# Patient Record
Sex: FEMALE | Race: WHITE | URBAN - METROPOLITAN AREA
[De-identification: names, ages, dates, MRNs, and addresses within clinical notes are randomized per-mention and may not be internally consistent; named-entity substitution may affect disease eponyms.]

---

## 2017-01-16 ENCOUNTER — TRANSFERRED RECORDS (OUTPATIENT)
Dept: HEALTH INFORMATION MANAGEMENT | Facility: CLINIC | Age: 67
End: 2017-01-16

## 2017-02-08 ENCOUNTER — TRANSFERRED RECORDS (OUTPATIENT)
Dept: HEALTH INFORMATION MANAGEMENT | Facility: CLINIC | Age: 67
End: 2017-02-08

## 2017-04-03 ENCOUNTER — PRE VISIT (OUTPATIENT)
Dept: NEUROLOGY | Facility: CLINIC | Age: 67
End: 2017-04-03

## 2017-04-03 NOTE — TELEPHONE ENCOUNTER
1.  Date/reason for appt:  4/13/17   Chronic Head Pain    2.  Referring provider:  Zoe Grossman    3.  Call to patient (Yes / No - short description):  No, referred.    4.  Previous care at / records requested from:  [facility]

## 2017-04-07 NOTE — TELEPHONE ENCOUNTER
Records received from [facility]. Waiting for images.   Included  Office notes: 2/25/16, 5/3/16, 10/19/16, 12/6/16, 1/12/17, 2/8/17  Radiology reports: MRI head brain on 1/16/17   CT thoracic on 2/8/17   CT lumbar on 2/8/17   CT cervical on 2/3/17   Xray blood patch on 2/3/17   Xray myelogram cervical thoracic lumbar on 2/3/17  Other: bilateral occipital neuralgia injection on 5/3/16, 12/6/16

## 2017-04-13 ENCOUNTER — OFFICE VISIT (OUTPATIENT)
Dept: NEUROLOGY | Facility: CLINIC | Age: 67
End: 2017-04-13

## 2017-04-13 VITALS
BODY MASS INDEX: 30.96 KG/M2 | HEIGHT: 61 IN | TEMPERATURE: 97.1 F | DIASTOLIC BLOOD PRESSURE: 67 MMHG | OXYGEN SATURATION: 95 % | SYSTOLIC BLOOD PRESSURE: 146 MMHG | HEART RATE: 59 BPM | WEIGHT: 164 LBS | RESPIRATION RATE: 20 BRPM

## 2017-04-13 DIAGNOSIS — G44.89 OTHER HEADACHE SYNDROME: Primary | ICD-10-CM

## 2017-04-13 RX ORDER — ACETAMINOPHEN 160 MG
4000 TABLET,DISINTEGRATING ORAL DAILY
COMMUNITY
Start: 2017-01-16 | End: 2017-09-05

## 2017-04-13 RX ORDER — DIPHENOXYLATE HYDROCHLORIDE AND ATROPINE SULFATE 2.5; .025 MG/1; MG/1
1 TABLET ORAL DAILY
COMMUNITY
Start: 2007-06-20 | End: 2017-09-05

## 2017-04-13 RX ORDER — PROPRANOLOL HYDROCHLORIDE 80 MG/1
80 CAPSULE, EXTENDED RELEASE ORAL DAILY
COMMUNITY
Start: 2017-01-31

## 2017-04-13 RX ORDER — AMLODIPINE BESYLATE 2.5 MG/1
2.5 TABLET ORAL DAILY
COMMUNITY
Start: 2017-03-08

## 2017-04-13 RX ORDER — OXYCODONE AND ACETAMINOPHEN 5; 325 MG/1; MG/1
1-2 TABLET ORAL DAILY PRN
COMMUNITY
Start: 2017-03-14

## 2017-04-13 RX ORDER — HYDROCHLOROTHIAZIDE 25 MG/1
25 TABLET ORAL DAILY
COMMUNITY
Start: 2017-03-31

## 2017-04-13 ASSESSMENT — PAIN SCALES - GENERAL: PAINLEVEL: MILD PAIN (3)

## 2017-04-13 NOTE — LETTER
4/13/2017       RE: Renée Almeida  [address]  [facility] [address]     Dear Colleague,    Thank you for referring your patient, Renée Almeida, to the [facility] NEUROLOGY at [facility]. Please see a copy of my visit note below.    HISTORY OF PRESENT ILLNESS:    Mrs. Almeida is a 66-year-old comes accompanied by her .  They are excellent historians.  She has been having headaches or other symptoms that I would relate to.  Her story goes that in the 1980s she had this severe pressure headache which was totally positional and which developed apparently fairly abruptly and she had to stay in bed and was diagnosed to have a low pressure headache syndrome and was treated with blood patch and bed rest and eventually got over it.  About 2 years ago, some of the symptoms have return.  She gets headaches and a particular feeling in her head when she stands up, but it is more the feeling.  She has had no abrupt headaches suggesting aneurysm or other than that.  She was told in 1980 she had a spinal leak.      She now has frontotemporal headaches and worst upon standing all may occur on recumbency.  She has had other sensations as well which included some transient numbness of arms and legs and dizziness when she stands up, and she was evaluated by Dr. Concepcion and was hospitalized at [facility] where they tried blood patch to see if it would help her.  The imaging studies have consistently shown superficial siderosis of the brain and she had her last CT scan on 03/27, which was actually normal, but her MRI in 2016 in April showed superficial widespread cysticercosis, most notably on posterior fossa and on the parasagittal area.   No mention is made of midline angiopathy and the sequences do not include a proper one for that.      She did have 1 repeated in January of this year and there was widespread siderosis was again seen.  She has had MR angiogram done in 2014 and that one was  normal.  The MRI of the head in  did still show hemosiderin deposit in the cerebellar convexity, ??, occipital and other regions.  Angiogram was done.  MR angiogram at that time and this was no stenosis or aneurysm was seen.        She had an MRI angiogram of the renal arteries and there have been no abnormalities seen there.  The patient did have attempt at a blood patch at [facility] on March of this year and this was for the epidural blood patch with a question of leakage.  She had had a myelogram done and she also developed some severe stomach cramping and idiopathic intracranial hypertension was also suspected and there were no signs of leakage across any site, and she has had CT scans of the spine and lumbar area and myelogram which was done looking for a leak that would explain the siderosis and the positional headaches and none was seen.  I do not see she has had a CT angio done.      PAST MEDICAL HISTORY:    Indicates that she has no other medical problems and has been healthy.        CURRENT MEDICATIONS:   Her medications consist of:   1.   Norvasc for hypertension.     2.  HydroDIURIL.     3.  Vitamin D.     4.  Multivitamin.   5.  Omeprazole.     6.  Inderal 80 mg per day.        PAST SURGICAL HISTORY:   She has no surgical history.      REVIEW OF SYSTEMS:  Indicates no other problems.      FAMILY HISTORY:  Indicates her mother apparently  of a cerebral aneurysm, leakage or some relatives in the family.      PHYSICAL EXAMINATION:     GENERAL:   She is a very pleasant woman.     NEUROLOGIC:   She has a normal mental status.  Her gait and station unremarkable.  Cranial nerves are normal.  Neck is supple.  Cerebellar testing are normal.  Reflexes are normoactive.  Plantars are flexor.  Sensory exam is normal.  Cerebellar testing is normal.  She is right-handed.      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  Myelographic studies  have not shown any points of CSF leak.  She did have an episode in  of CSF hypovolemic headache syndrome which they attempted to treat with a blood patch.  There is a positive family history of aneurysm.      The source of possible bleeding would include aneurysm, which has not detected or amyloid angiopathy.  She has had MRI studies, but I do not see the sequence that would reveal amyloid angiopathy.  I do not have the actual films, just a report which are excellent.      We will search for a cause.  I may not find any.  We will discuss with our Stroke Service any possible other treatment options.      Thank you for referring her to the [place].      Jovanny Perez MD      cc:   Mark Anthony Muhammad MD   [place] [id number]      Dilma Concepcion MD   [address] [address]      D: 2017 15:15   T: 2017 07:56   MT: JAMES      Name:     SHAWN MAJOR   MRN:      [id number]        Account:      [id number]   :      1950           Service Date: 2017      Document: [id number]

## 2017-04-13 NOTE — MR AVS SNAPSHOT
After Visit Summary   4/13/2017    Renée Almeida    MRN: [id number]           Patient Information     Date Of Birth          1950        Visit Information        Provider Department      4/13/2017 2:00 PM Jovanny Perez MD [facility] Neurology        Today's Diagnoses     Other headache syndrome    -  1       Follow-ups after your visit        Follow-up notes from your care team     Return in about 4 weeks (around 5/11/2017), or if symptoms worsen or fail to improve.      Your next 10 appointments already scheduled     May 06, 2017  8:30 AM CDT   (Arrive by 8:15 AM)   MR BRAIN FOR STROKE LIMITED with [id number]   [facility] ([facility])    [address] [address] [phone]           Take your medicines as usual, unless your doctor tells you not to. Bring a list of your current medicines to your exam (including vitamins, minerals and over-the-counter drugs). Also bring the results of similar scans you may have had.  Please remove any body piercings and hair extensions before you arrive.  You will have contrast for this exam. The day before your exam, drink extra fluids at least six 8-ounce glasses (unless your doctor tells you to restrict your fluids).  The MRI machine uses a strong magnet. Please wear clothes without metal (snaps, zippers). A sweatsuit works well, or we may give you a hospital gown.  You will remove watches, jewelry, hairpins, wallets, dentures, partial dental plates and hearing aids. You may wear contact lenses, and you may be able to wear your rings. We have a safe place to keep your personal items, but it is safer to leave them at home.  A recent (within 30 days) creatinine lab value is needed for patients over 70 years of age or with any type of kidney function compromise.  If you will receive sedation (take medicine to help you relax during your exam)   You must get the medicine from your doctor  before you arrive. Bring the medicine to the exam. Do not take it at home.   Arrive one hour early. Bring someone who can take you home after the test. Your medicine will make you sleepy. After the exam, you may not drive, take a bus or take a taxi by yourself.   No eating 8 hours before your exam. You may have clear liquids up until 4 hours before your exam. (Clear liquids include water, clear tea, black coffee and fruit juice without pulp.)  If you will receive general anesthesia (be asleep for your exam)   Arrive 11/2 hours early. Bring someone who can take you home after the test. You may not drive, take a bus or take a taxi by yourself.   No eating 8 hours before your exam. You may have clear liquids up until 4 hours before your exam. (Clear liquids include water, clear tea, black coffee and fruit juice without pulp.)              Who to contact     Please call your clinic at [phone] to:    Ask questions about your health    Make or cancel appointments    Discuss your medicines    Learn about your test results    Speak to your doctor   If you have compliments or concerns about an experience at your clinic, or if you wish to file a complaint, please contact [facility] Patient Relations at [phone] or email us at [email or web address]         Additional Information About Your Visit        [facility] Information     iversity gives you secure access to your electronic health record. If you see a primary care provider, you can also send messages to your care team and make appointments. If you have questions, please call your primary care clinic.  If you do not have a primary care provider, please call [phone] and they will assist you.      iversity is an electronic gateway that provides easy, online access to your medical records. With iversity, you can request a clinic appointment, read your test results, renew a prescription or communicate with your care team.     To  "access your existing account, please contact your [facility] or call [phone] for assistance.        Care EveryWhere ID     This is your Care EveryWhere ID. This could be used by other organizations to access your [facility] medical records  [id number]        Your Vitals Were     Pulse Temperature Respirations Height Pulse Oximetry Breastfeeding?    59 97.1  F (36.2  C) (Oral) 20 1.549 m (5' 1\") 95% No    BMI (Body Mass Index)                   30.99 kg/m2            Blood Pressure from Last 3 Encounters:   04/13/17 146/67    Weight from Last 3 Encounters:   04/13/17 74.4 kg (164 lb)              Today, you had the following     No orders found for display       Primary Care Provider Office Phone # Fax #    Mark Anthony Muhammad [phone]       [address] [address]        Thank you!     Thank you for choosing [facility] NEUROLOGY  for your care. Our goal is always to provide you with excellent care. Hearing back from our patients is one way we can continue to improve our services. Please take a few minutes to complete the written survey that you may receive in the mail after your visit with us. Thank you!             Your Updated Medication List - Protect others around you: Learn how to safely use, store and throw away your medicines at www.disposemymeds.org.          This list is accurate as of: 4/13/17 11:59 PM.  Always use your most recent med list.                   Brand Name Dispense Instructions for use    amLODIPine 2.5 MG tablet    NORVASC     Take 2.5 mg by mouth daily       hydrochlorothiazide 25 MG tablet    HYDRODIURIL     Take 25 mg by mouth daily       MULTI-VITAMINS Tabs      Take 1 tablet by mouth daily       omeprazole 20 MG CR capsule    priLOSEC     Take 20 mg by mouth daily       oxyCODONE-acetaminophen 5-325 MG per tablet    PERCOCET     Take 1-2 tablets by mouth daily as needed       propranolol 80 MG 24 hr capsule "    INDERAL LA     Take 80 mg by mouth daily       vitamin D3 2000 UNITS Caps      Take 4,000 Units by mouth daily

## 2017-04-14 NOTE — PROGRESS NOTES
HISTORY OF PRESENT ILLNESS:    Mrs. Almeida is a 66-year-old comes accompanied by her .  They are excellent historians.  She has been having headaches or other symptoms that I would relate to.  Her story goes that in the 1980s she had this severe pressure headache which was totally positional and which developed apparently fairly abruptly and she had to stay in bed and was diagnosed to have a low pressure headache syndrome and was treated with blood patch and bed rest and eventually got over it.  About 2 years ago, some of the symptoms have return.  She gets headaches and a particular feeling in her head when she stands up, but it is more the feeling.  She has had no abrupt headaches suggesting aneurysm or other than that.  She was told in 1980 she had a spinal leak.      She now has frontotemporal headaches and worst upon standing all may occur on recumbency.  She has had other sensations as well which included some transient numbness of arms and legs and dizziness when she stands up, and she was evaluated by Dr. Concepcion and was hospitalized at [facility] where they tried blood patch to see if it would help her.  The imaging studies have consistently shown superficial siderosis of the brain and she had her last CT scan on 03/27, which was actually normal, but her MRI in 2016 in April showed superficial widespread cysticercosis, most notably on posterior fossa and on the parasagittal area.   No mention is made of midline angiopathy and the sequences do not include a proper one for that.      She did have 1 repeated in January of this year and there was widespread siderosis was again seen.  She has had MR angiogram done in 2014 and that one was normal.  The MRI of the head in 2014 did still show hemosiderin deposit in the cerebellar convexity, ??, occipital and other regions.  Angiogram was done.  MR angiogram at that time and this was no stenosis or aneurysm was seen.        She had an MRI angiogram of the renal  arteries and there have been no abnormalities seen there.  The patient did have attempt at a blood patch at [facility] on March of this year and this was for the epidural blood patch with a question of leakage.  She had had a myelogram done and she also developed some severe stomach cramping and idiopathic intracranial hypertension was also suspected and there were no signs of leakage across any site, and she has had CT scans of the spine and lumbar area and myelogram which was done looking for a leak that would explain the siderosis and the positional headaches and none was seen.  I do not see she has had a CT angio done.      PAST MEDICAL HISTORY:    Indicates that she has no other medical problems and has been healthy.        CURRENT MEDICATIONS:   Her medications consist of:   1.   Norvasc for hypertension.     2.  HydroDIURIL.     3.  Vitamin D.     4.  Multivitamin.   5.  Omeprazole.     6.  Inderal 80 mg per day.        PAST SURGICAL HISTORY:   She has no surgical history.      REVIEW OF SYSTEMS:  Indicates no other problems.      FAMILY HISTORY:  Indicates her mother apparently  of a cerebral aneurysm, leakage or some relatives in the family.      PHYSICAL EXAMINATION:     GENERAL:   She is a very pleasant woman.     NEUROLOGIC:   She has a normal mental status.  Her gait and station unremarkable.  Cranial nerves are normal.  Neck is supple.  Cerebellar testing are normal.  Reflexes are normoactive.  Plantars are flexor.  Sensory exam is normal.  Cerebellar testing is normal.  She is right-handed.      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  Myelographic studies have not shown any points of CSF leak.  She did have an episode in  of CSF hypovolemic headache syndrome which they attempted to treat with a blood patch.  There is a positive family history of aneurysm.      The source of possible bleeding would include aneurysm, which  has not detected or amyloid angiopathy.  She has had MRI studies, but I do not see the sequence that would reveal amyloid angiopathy.  I do not have the actual films, just a report which are excellent.      We will search for a cause.  I may not find any.  We will discuss with our Stroke Service any possible other treatment options.      Thank you for referring her to the [facility].      Jovanny Perez MD      cc:   [place] [address]      [place] [address]         JOVANNY PEREZ MD             D: 2017 15:15   T: 2017 07:56   MT: JAMES      Name:     SHAWN MAJOR   MRN:      [id number]        Account:      [id number]   :      1950           Service Date: 2017      Document: [id number]

## 2017-05-02 DIAGNOSIS — G44.031 INTRACTABLE PAROXYSMAL HEMICRANIA, UNSPECIFIED CHRONICITY PATTERN: Primary | ICD-10-CM

## 2017-05-02 DIAGNOSIS — I60.9 SUBARACHNOID HEMORRHAGE (H): ICD-10-CM

## 2017-05-19 ENCOUNTER — PRE VISIT (OUTPATIENT)
Dept: RADIOLOGY | Facility: CLINIC | Age: 67
End: 2017-05-19

## 2017-05-19 NOTE — TELEPHONE ENCOUNTER
1.  Date/reason for appt:5/23/17, Stroke  2.  Referring provider: ALEX KATZ  3.  Call to patient (Yes / No - short description): No, referred   4.  Previous care at / records requested from:   [facility]- office notes and imaging are in epic.

## 2017-05-20 ASSESSMENT — ENCOUNTER SYMPTOMS
NECK PAIN: 0
TREMORS: 0
NUMBNESS: 1
SORE THROAT: 0
STIFFNESS: 0
SEIZURES: 0
LOSS OF CONSCIOUSNESS: 0
WEAKNESS: 1
ORTHOPNEA: 0
INSOMNIA: 1
DIZZINESS: 1
PANIC: 0
NECK MASS: 0
MUSCLE CRAMPS: 1
TASTE DISTURBANCE: 0
DECREASED CONCENTRATION: 1
HYPERTENSION: 1
MUSCLE WEAKNESS: 0
SLEEP DISTURBANCES DUE TO BREATHING: 0
TROUBLE SWALLOWING: 0
ARTHRALGIAS: 0
LEG PAIN: 0
HOARSE VOICE: 0
MEMORY LOSS: 0
LIGHT-HEADEDNESS: 1
SINUS CONGESTION: 0
TINGLING: 0
HYPOTENSION: 0
SYNCOPE: 0
PARALYSIS: 0
LEG SWELLING: 0
TACHYCARDIA: 0
DISTURBANCES IN COORDINATION: 0
NERVOUS/ANXIOUS: 1
MYALGIAS: 0
SMELL DISTURBANCE: 0
DEPRESSION: 0
EXERCISE INTOLERANCE: 1
SPEECH CHANGE: 0
BACK PAIN: 1
HEADACHES: 1
JOINT SWELLING: 0
PALPITATIONS: 0
CLAUDICATION: 0
SINUS PAIN: 0

## 2017-05-23 ENCOUNTER — OFFICE VISIT (OUTPATIENT)
Dept: RADIOLOGY | Facility: CLINIC | Age: 67
End: 2017-05-23

## 2017-05-23 VITALS
DIASTOLIC BLOOD PRESSURE: 59 MMHG | SYSTOLIC BLOOD PRESSURE: 141 MMHG | BODY MASS INDEX: 31.23 KG/M2 | HEART RATE: 62 BPM | HEIGHT: 61 IN | WEIGHT: 165.4 LBS

## 2017-05-23 DIAGNOSIS — G96.89 SUPERFICIAL SIDEROSIS OF CENTRAL NERVOUS SYSTEM: Primary | ICD-10-CM

## 2017-05-23 ASSESSMENT — PAIN SCALES - GENERAL: PAINLEVEL: SEVERE PAIN (7)

## 2017-05-23 NOTE — LETTER
5/23/2017       RE: Renée Almeida  [address]  [place] [address]     Dear Colleague,    Thank you for referring your patient, Renée Almeida, to the [facility] NEUROSURGERY at [facility]. Please see a copy of my visit note below.    Neurointerventional Clinic Note    HPI: Renée Almeida is a 66 year old female with history of intractable headaches. Her headaches are dull and involves the both sides. She has these headaches daily. Tylenol provides some relief. Her work-up MRI showed superficial siderosis along the cerebellar convexities. No aneurysm or other vascular malformation in the MRA of the head and neck. She is otherwise neurologically intact.       Past Medical History:   Diagnosis Date     Arthritis     hand     Gastroesophageal reflux disease      Hypertension      Superficial siderosis of central nervous system      Past Surgical History:   Procedure Laterality Date     CHOLECYSTECTOMY       HYSTERECTOMY       KNEE SURGERY      right knee arthroscopy     SHOULDER SURGERY      right rotator cuff and bicep repair     Social History     Social History     Marital status: Single     Spouse name: N/A     Number of children: N/A     Years of education: N/A     Occupational History     Not on file.     Social History Main Topics     Smoking status: Never Smoker     Smokeless tobacco: Never Used     Alcohol use No     Drug use: No     Sexual activity: Yes     Partners: Male     Other Topics Concern     Not on file     Social History Narrative     History reviewed. No pertinent family history.  Allergies   Allergen Reactions     Codeine Other (See Comments)     Chest pain     Review of Systems    Current Outpatient Prescriptions on File Prior to Visit:  amLODIPine (NORVASC) 2.5 MG tablet Take 2.5 mg by mouth daily   Cholecalciferol (VITAMIN D3) 2000 UNITS CAPS Take 4,000 Units by mouth daily   hydrochlorothiazide (HYDRODIURIL) 25 MG tablet Take 25 mg by mouth daily  "  Multiple Vitamin (MULTI-VITAMINS) TABS Take 1 tablet by mouth daily   omeprazole (PRILOSEC) 20 MG CR capsule Take 20 mg by mouth daily   oxyCODONE-acetaminophen (PERCOCET) 5-325 MG per tablet Take 1-2 tablets by mouth daily as needed   propranolol (INDERAL LA) 80 MG 24 hr capsule Take 80 mg by mouth daily     No current facility-administered medications on file prior to visit.     /59 (BP Location: Left arm, Patient Position: Chair, Cuff Size: Adult Regular)  Pulse 62  Ht 1.549 m (5' 1\")  Wt 75 kg (165 lb 6.4 oz)  BMI 31.25 kg/m2  MS: AAOx3  Speech: no aphasia or dysarthria  CN: II-XII intact  Motor: 5/5 strength throughout, nml tone and bulk; no abnormal movement noted  Sensory: intact to LT x 4  Coordination: FNF/AMBER/HKS intact    Labs: Reviewed     Images: Reviewed     A/P: 66 year old female with superficial cortical siderosis. No vascular malformation on head and neck MRA. The source of hemosiderin could be from a spinal vascular malformation. We would like to the a spinal angiography to assess for any vascular malformation. Patient agrees with the plan.    Patient is discussed with the attending physician Dr Irvin.    Isacc Child M.D.  Endovascular Surgical Neuroradiology Fellow  Pager: [phone]    I agree with the above note by Dr. Child        on  5/23/17    Again, thank you for allowing me to participate in the care of your patient.    Sincerely,  Jourdan Irvin MD      "

## 2017-05-23 NOTE — MR AVS SNAPSHOT
After Visit Summary   5/23/2017    Renée Almeida    MRN: [id number]           Patient Information     Date Of Birth          1950        Visit Information        Provider Department      5/23/2017 2:10 PM Jourdan Irvin MD [facility] Neurosurgery        Today's Diagnoses     Superficial siderosis of central nervous system    -  1       Follow-ups after your visit        Your next 10 appointments already scheduled     Jun 02, 2017   Procedure with GENERIC ANESTHESIA PROVIDER   [facility]    [address] [id number] [phone]            Jun 02, 2017 12:00 PM CDT   IR CAROTID CEREBRAL ANGIOGRAM BILATERAL with UUIR1   [facility] [place], Interventional Radiology ([facility], [facility])    [address] [id number] [phone]           1. Your doctor will need to do a history and physical within 30 days before this procedure. 2. Your doctor will determine whether you need a 12 lead EKG, as well as which medications should not be taken the morning of the exam. 3. Laboratory tests are to be obtained by your doctor prior to the exam (creatinine, Hgb/Hct, platelet count, and INR) 4. If you have allergies to x-ray contrast or iodine, contact your doctor or a Radiology nurse prior to the exam day for instructions. 5. Someone will need to drive you to and from the hospital. 6. Bring a list of all drugs you are taking; include supplements and over-the-counter medications. 7. Wear comfortable clothes and leave your valuables at home. 8. If you are or may be pregnant, contact your doctor or a Radiology nurse prior to the day of the exam. 9.  If you have diabetes, check with your doctor or a Radiology nurse to see if your insulin needs to be adjusted for the exam. 10. If you are taking Coumadin (to thin you blood) please contact your doctor or a Radiology nurse at least 5 days before the exam  for special instructions. 11. You should not have received barium (x-ray contrast) within 48 hours of this exam. 12. The day before your exam you may eat your regular diet and are encouraged to drink at least 2 quarts of clear liquids. Drink no alcoholic beverages for 24 hours prior to the exam. 13. If you have a colostomy you will need to irrigate it with tap water at 8PM the evening before and again at 6AM the morning of the exam. 14. Do not smoke for 24 hours prior to the procedure. 15. Do not eat any solid food or milk products for 6 hours prior to the exam. You may drink clear liquids until 2 hours prior to the exam. Clear liquids include the following: water, Jell-O, clear broth, apple juice or any non-carbonated drink that you can see through (no pop!) 16. The morning of the exam you may brush your teeth and take medications as directed with a sip of water. 17. Tell the Radiology nurse if you have any allergies. 18. You will be asked to empty your bladder before the exam begins. 19. Following the exam you will need to remain on complete bedrest for 4-6 hours. The nurse will monitor your vital signs, puncture site, and the pulses and temperature of the arm or leg that was punctured. 20. When discharged, you cannot drive until morning, and an adult must be with you until then. You should stay in the [facility] area overnight.              Who to contact     Please call your clinic at [phone] to:    Ask questions about your health    Make or cancel appointments    Discuss your medicines    Learn about your test results    Speak to your doctor   If you have compliments or concerns about an experience at your clinic, or if you wish to file a complaint, please contact [facility] Patient Relations at [phone] or email us at [email or web address]         Additional Information About Your Visit        DeskActivehart Information     TYMR gives you secure access to your  "electronic health record. If you see a primary care provider, you can also send messages to your care team and make appointments. If you have questions, please call your primary care clinic.  If you do not have a primary care provider, please call [phone] and they will assist you.      tibdit is an electronic gateway that provides easy, online access to your medical records. With tibdit, you can request a clinic appointment, read your test results, renew a prescription or communicate with your care team.     To access your existing account, please contact your [facility] or call [phone] for assistance.        Care EveryWhere ID     This is your Care EveryWhere ID. This could be used by other organizations to access your [place] medical records  [id number]        Your Vitals Were     Pulse Height BMI (Body Mass Index)             62 1.549 m (5' 1\") 31.25 kg/m2          Blood Pressure from Last 3 Encounters:   05/23/17 141/59   04/13/17 146/67    Weight from Last 3 Encounters:   05/23/17 75 kg (165 lb 6.4 oz)   04/13/17 74.4 kg (164 lb)               Primary Care Provider Office Phone # Fax #    Mark Anthony Muhammad [phone]       [place] [phone]        Thank you!     Thank you for choosing [facility]  for your care. Our goal is always to provide you with excellent care. Hearing back from our patients is one way we can continue to improve our services. Please take a few minutes to complete the written survey that you may receive in the mail after your visit with us. Thank you!             Your Updated Medication List - Protect others around you: Learn how to safely use, store and throw away your medicines at www.disposemymeds.org.          This list is accurate as of: 5/23/17 11:59 PM.  Always use your most recent med list.                   Brand Name Dispense Instructions for use    amLODIPine 2.5 MG tablet "    NORVASC     Take 2.5 mg by mouth daily       hydrochlorothiazide 25 MG tablet    HYDRODIURIL     Take 25 mg by mouth daily       MULTI-VITAMINS Tabs      Take 1 tablet by mouth daily       omeprazole 20 MG CR capsule    priLOSEC     Take 20 mg by mouth daily       oxyCODONE-acetaminophen 5-325 MG per tablet    PERCOCET     Take 1-2 tablets by mouth daily as needed       propranolol 80 MG 24 hr capsule    INDERAL LA     Take 80 mg by mouth daily       vitamin D3 2000 UNITS Caps      Take 4,000 Units by mouth daily

## 2017-05-24 ENCOUNTER — CARE COORDINATION (OUTPATIENT)
Dept: NEUROLOGY | Facility: CLINIC | Age: 67
End: 2017-05-24

## 2017-05-24 NOTE — PATIENT INSTRUCTIONS
You are scheduled for a spinal angiogram (under general anesthesia) on 6/2/17 at 12:00 PM. You will need a pre-op physical within 30 days prior to your procedure. Someone will need to drive you home from the hospital. Be sure to have someone that can stay with you through the night. Do not eat after 4:00 AM; you may drink clear liquids (includes water, Jell-O, clear broth, apple juice or any non-carbonated beverage that you can see through) until 10:00 AM. You may take your medications with a sip of water the morning of the procedure. Please go to 3C, Surgery Check-in, on the third floor of [facility] to check in at 10:00 AM.    If you have any questions please contact me at [phone], option 3.    Angelo Agee RN, CNRN  Stroke & Endovascular Care Coordinator

## 2017-05-24 NOTE — PROGRESS NOTES
Patient scheduled for a spinal angiogram (under general anesthesia) on 6/2/17 at 12:00 PM. Informed patient: You will need a pre-op physical within 30 days prior to your procedure. Someone will need to drive you home from the hospital. Be sure to have someone that can stay with you through the night. Do not eat after 4:00 AM; you may drink clear liquids (includes water, Jell-O, clear broth, apple juice or any non-carbonated beverage that you can see through) until 10:00 AM. You may take your medications with a sip of water the morning of the procedure. Please go to 3C, Surgery Check-in, on the third floor of [facility] to check in at 10:00 AM. Patient verbalized understanding. Map and instructions sent to patient.

## 2017-05-30 NOTE — PROGRESS NOTES
Neurointerventional Clinic Note    HPI: Renée Almeida is a 66 year old female with history of intractable headaches. Her headaches are dull and involves the both sides. She has these headaches daily. Tylenol provides some relief. Her work-up MRI showed superficial siderosis along the cerebellar convexities. No aneurysm or other vascular malformation in the MRA of the head and neck. She is otherwise neurologically intact.       Past Medical History:   Diagnosis Date     Arthritis     hand     Gastroesophageal reflux disease      Hypertension      Superficial siderosis of central nervous system      Past Surgical History:   Procedure Laterality Date     CHOLECYSTECTOMY       HYSTERECTOMY       KNEE SURGERY      right knee arthroscopy     SHOULDER SURGERY      right rotator cuff and bicep repair     Social History     Social History     Marital status: Single     Spouse name: N/A     Number of children: N/A     Years of education: N/A     Occupational History     Not on file.     Social History Main Topics     Smoking status: Never Smoker     Smokeless tobacco: Never Used     Alcohol use No     Drug use: No     Sexual activity: Yes     Partners: Male     Other Topics Concern     Not on file     Social History Narrative     History reviewed. No pertinent family history.  Allergies   Allergen Reactions     Codeine Other (See Comments)     Chest pain       Review of Systems    Answers for HPI/ROS submitted by the patient on 5/20/2017   General Symptoms: No  Skin Symptoms: No  HENT Symptoms: Yes  EYE SYMPTOMS: No  HEART SYMPTOMS: Yes  LUNG SYMPTOMS: No  INTESTINAL SYMPTOMS: No  URINARY SYMPTOMS: No  GYNECOLOGIC SYMPTOMS: No  BREAST SYMPTOMS: No  SKELETAL SYMPTOMS: Yes  BLOOD SYMPTOMS: No  NERVOUS SYSTEM SYMPTOMS: Yes  MENTAL HEALTH SYMPTOMS: Yes  Ear pain: No  Ear discharge: No  Hearing loss: No  Tinnitus: Yes  Nosebleeds: No  Congestion: No  Sinus pain: No  Trouble swallowing: No   Voice hoarseness: No  Mouth sores:  No  Sore throat: No  Tooth pain: No  Gum tenderness: No  Bleeding gums: No  Change in taste: No  Change in sense of smell: No  Dry mouth: No  Hearing aid used: No  Neck lump: No  Chest pain or pressure: No  Fast or irregular heartbeat: No  Pain in legs with walking: No  Swelling in feet or ankles: No  Trouble breathing while lying down: No  Fingers or Toes appear blue: No  High blood pressure: Yes  Low blood pressure: No  Fainting: No  Murmurs: No  Chest pain on exertion: No  Chest pain at rest: No  Cramping pain in leg during exercise: No  Pacemaker: No  Varicose veins: No  Edema or swelling: No  Fast heart beat: No  Wake up at night with shortness of breath: No  Heart flutters: No  Light-headedness: Yes  Exercise intolerance: Yes  Back pain: Yes  Muscle aches: No  Neck pain: No  Swollen joints: No  Joint pain: No  Bone pain: No  Muscle cramps: Yes  Muscle weakness: No  Joint stiffness: No  Bone fracture: No  Trouble with coordination: No  Dizziness or trouble with balance: Yes  Fainting or black-out spells: No  Memory loss: No  Headache: Yes  Seizures: No  Speech problems: No  Tingling: No  Tremor: No  Weakness: Yes  Difficulty walking: Yes  Paralysis: No  Numbness: Yes  Nervous or Anxious: Yes  Depression: No  Trouble sleeping: Yes  Trouble thinking or concentrating: Yes  Mood changes: No  Panic attacks: No      Current Outpatient Prescriptions on File Prior to Visit:  amLODIPine (NORVASC) 2.5 MG tablet Take 2.5 mg by mouth daily   Cholecalciferol (VITAMIN D3) 2000 UNITS CAPS Take 4,000 Units by mouth daily   hydrochlorothiazide (HYDRODIURIL) 25 MG tablet Take 25 mg by mouth daily   Multiple Vitamin (MULTI-VITAMINS) TABS Take 1 tablet by mouth daily   omeprazole (PRILOSEC) 20 MG CR capsule Take 20 mg by mouth daily   oxyCODONE-acetaminophen (PERCOCET) 5-325 MG per tablet Take 1-2 tablets by mouth daily as needed   propranolol (INDERAL LA) 80 MG 24 hr capsule Take 80 mg by mouth daily     No current  "facility-administered medications on file prior to visit.     /59 (BP Location: Left arm, Patient Position: Chair, Cuff Size: Adult Regular)  Pulse 62  Ht 1.549 m (5' 1\")  Wt 75 kg (165 lb 6.4 oz)  BMI 31.25 kg/m2  MS: AAOx3  Speech: no aphasia or dysarthria  CN: II-XII intact  Motor: 5/5 strength throughout, nml tone and bulk; no abnormal movement noted  Sensory: intact to LT x 4  Coordination: FNF/AMBER/HKS intact    Labs: Reviewed     Images: Reviewed     A/P: 66 year old female with superficial cortical siderosis. No vascular malformation on head and neck MRA. The source of hemosiderin could be from a spinal vascular malformation. We would like to the a spinal angiography to assess for any vascular malformation. Patient agrees with the plan.    Patient is discussed with the attending physician Dr Irvin.    Isacc Child M.D.  Endovascular Surgical Neuroradiology Fellow  Pager: [phone]    I agree with the above note by Dr. Child        on  5/23/17    "

## 2017-06-01 RX ORDER — FLUMAZENIL 0.1 MG/ML
0.2 INJECTION, SOLUTION INTRAVENOUS
Status: CANCELLED | OUTPATIENT
Start: 2017-06-01

## 2017-06-01 RX ORDER — FENTANYL CITRATE 50 UG/ML
25-50 INJECTION, SOLUTION INTRAMUSCULAR; INTRAVENOUS EVERY 5 MIN PRN
Status: CANCELLED | OUTPATIENT
Start: 2017-06-01

## 2017-06-01 RX ORDER — NALOXONE HYDROCHLORIDE 0.4 MG/ML
.1-.4 INJECTION, SOLUTION INTRAMUSCULAR; INTRAVENOUS; SUBCUTANEOUS
Status: CANCELLED | OUTPATIENT
Start: 2017-06-01

## 2017-06-02 ENCOUNTER — ANESTHESIA (OUTPATIENT)
Dept: SURGERY | Facility: CLINIC | Age: 67
End: 2017-06-02
Payer: MEDICARE

## 2017-06-02 ENCOUNTER — ANESTHESIA EVENT (OUTPATIENT)
Dept: SURGERY | Facility: CLINIC | Age: 67
End: 2017-06-02
Payer: MEDICARE

## 2017-06-02 ENCOUNTER — SURGERY (OUTPATIENT)
Age: 67
End: 2017-06-02

## 2017-06-02 ENCOUNTER — APPOINTMENT (OUTPATIENT)
Dept: INTERVENTIONAL RADIOLOGY/VASCULAR | Facility: CLINIC | Age: 67
End: 2017-06-02
Attending: RADIOLOGY
Payer: MEDICARE

## 2017-06-02 ENCOUNTER — HOSPITAL ENCOUNTER (OUTPATIENT)
Facility: CLINIC | Age: 67
Discharge: HOME | End: 2017-06-02
Attending: RADIOLOGY | Admitting: RADIOLOGY
Payer: MEDICARE

## 2017-06-02 VITALS
DIASTOLIC BLOOD PRESSURE: 68 MMHG | WEIGHT: 164.9 LBS | TEMPERATURE: 98 F | BODY MASS INDEX: 31.13 KG/M2 | HEIGHT: 61 IN | OXYGEN SATURATION: 98 % | SYSTOLIC BLOOD PRESSURE: 128 MMHG | RESPIRATION RATE: 16 BRPM

## 2017-06-02 DIAGNOSIS — G96.89 SUPERFICIAL SIDEROSIS OF CENTRAL NERVOUS SYSTEM: ICD-10-CM

## 2017-06-02 LAB
APTT PPP: 26 SEC (ref 22–37)
CREAT SERPL-MCNC: 0.68 MG/DL (ref 0.52–1.04)
ERYTHROCYTE [DISTWIDTH] IN BLOOD BY AUTOMATED COUNT: 12.4 % (ref 10–15)
GFR SERPL CREATININE-BSD FRML MDRD: 86 ML/MIN/1.7M2
HCT VFR BLD AUTO: 37.9 % (ref 35–47)
HGB BLD-MCNC: 12.7 G/DL (ref 11.7–15.7)
INR PPP: 1.01 (ref 0.86–1.14)
MCH RBC QN AUTO: 30.3 PG (ref 26.5–33)
MCHC RBC AUTO-ENTMCNC: 33.5 G/DL (ref 31.5–36.5)
MCV RBC AUTO: 91 FL (ref 78–100)
PLATELET # BLD AUTO: 304 10E9/L (ref 150–450)
RBC # BLD AUTO: 4.19 10E12/L (ref 3.8–5.2)
WBC # BLD AUTO: 6.2 10E9/L (ref 4–11)

## 2017-06-02 PROCEDURE — 27210908 ZZH NEEDLE CR4

## 2017-06-02 PROCEDURE — 40000170 ZZH STATISTIC PRE-PROCEDURE ASSESSMENT II

## 2017-06-02 PROCEDURE — 25000125 ZZHC RX 250: Performed by: NURSE ANESTHETIST, CERTIFIED REGISTERED

## 2017-06-02 PROCEDURE — 75705 ARTERY X-RAYS SPINE: CPT | Mod: XS

## 2017-06-02 PROCEDURE — 25000565 ZZH ISOFLURANE, EA 15 MIN

## 2017-06-02 PROCEDURE — 36215 PLACE CATHETER IN ARTERY: CPT | Mod: XU

## 2017-06-02 PROCEDURE — 27210907 ZZH KIT CR9

## 2017-06-02 PROCEDURE — 25000128 H RX IP 250 OP 636: Performed by: ANESTHESIOLOGY

## 2017-06-02 PROCEDURE — 27210732 ZZH ACCESSORY CR1

## 2017-06-02 PROCEDURE — 37000008 ZZH ANESTHESIA TECHNICAL FEE, 1ST 30 MIN

## 2017-06-02 PROCEDURE — 36245 INS CATH ABD/L-EXT ART 1ST: CPT

## 2017-06-02 PROCEDURE — 25000128 H RX IP 250 OP 636: Performed by: NURSE ANESTHETIST, CERTIFIED REGISTERED

## 2017-06-02 PROCEDURE — 36415 COLL VENOUS BLD VENIPUNCTURE: CPT | Performed by: RADIOLOGY

## 2017-06-02 PROCEDURE — 25000128 H RX IP 250 OP 636: Performed by: RADIOLOGY

## 2017-06-02 PROCEDURE — A9270 NON-COVERED ITEM OR SERVICE: HCPCS | Mod: GY | Performed by: NURSE ANESTHETIST, CERTIFIED REGISTERED

## 2017-06-02 PROCEDURE — C9399 UNCLASSIFIED DRUGS OR BIOLOG: HCPCS | Performed by: NURSE ANESTHETIST, CERTIFIED REGISTERED

## 2017-06-02 PROCEDURE — 36217 PLACE CATHETER IN ARTERY: CPT | Mod: XS

## 2017-06-02 PROCEDURE — 82565 ASSAY OF CREATININE: CPT | Performed by: RADIOLOGY

## 2017-06-02 PROCEDURE — 25000132 ZZH RX MED GY IP 250 OP 250 PS 637: Mod: GY | Performed by: NURSE ANESTHETIST, CERTIFIED REGISTERED

## 2017-06-02 PROCEDURE — 37000009 ZZH ANESTHESIA TECHNICAL FEE, EACH ADDTL 15 MIN

## 2017-06-02 PROCEDURE — 36226 PLACE CATH VERTEBRAL ART: CPT | Mod: 50

## 2017-06-02 PROCEDURE — 85610 PROTHROMBIN TIME: CPT | Performed by: RADIOLOGY

## 2017-06-02 PROCEDURE — 85027 COMPLETE CBC AUTOMATED: CPT | Performed by: RADIOLOGY

## 2017-06-02 PROCEDURE — 71000016 ZZH RECOVERY PHASE 1 LEVEL 3 FIRST HR

## 2017-06-02 PROCEDURE — 71000027 ZZH RECOVERY PHASE 2 EACH 15 MINS

## 2017-06-02 PROCEDURE — C1769 GUIDE WIRE: HCPCS

## 2017-06-02 PROCEDURE — 85730 THROMBOPLASTIN TIME PARTIAL: CPT | Performed by: RADIOLOGY

## 2017-06-02 PROCEDURE — 27210742 ZZH CATH CR1

## 2017-06-02 PROCEDURE — 27210802 ZZH SHEATH CR1

## 2017-06-02 RX ORDER — FENTANYL CITRATE 50 UG/ML
25-50 INJECTION, SOLUTION INTRAMUSCULAR; INTRAVENOUS
Status: DISCONTINUED | OUTPATIENT
Start: 2017-06-02 | End: 2017-06-02 | Stop reason: HOSPADM

## 2017-06-02 RX ORDER — ONDANSETRON 2 MG/ML
4 INJECTION INTRAMUSCULAR; INTRAVENOUS EVERY 30 MIN PRN
Status: DISCONTINUED | OUTPATIENT
Start: 2017-06-02 | End: 2017-06-02 | Stop reason: HOSPADM

## 2017-06-02 RX ORDER — SODIUM CHLORIDE 9 MG/ML
INJECTION, SOLUTION INTRAVENOUS CONTINUOUS
Status: DISCONTINUED | OUTPATIENT
Start: 2017-06-02 | End: 2017-06-02 | Stop reason: HOSPADM

## 2017-06-02 RX ORDER — ONDANSETRON 4 MG/1
4 TABLET, ORALLY DISINTEGRATING ORAL EVERY 30 MIN PRN
Status: DISCONTINUED | OUTPATIENT
Start: 2017-06-02 | End: 2017-06-02 | Stop reason: HOSPADM

## 2017-06-02 RX ORDER — GLYCOPYRROLATE 0.2 MG/ML
INJECTION, SOLUTION INTRAMUSCULAR; INTRAVENOUS PRN
Status: DISCONTINUED | OUTPATIENT
Start: 2017-06-02 | End: 2017-06-02

## 2017-06-02 RX ORDER — LIDOCAINE 40 MG/G
CREAM TOPICAL
Status: DISCONTINUED | OUTPATIENT
Start: 2017-06-02 | End: 2017-06-02 | Stop reason: HOSPADM

## 2017-06-02 RX ORDER — ONDANSETRON 2 MG/ML
4 INJECTION INTRAMUSCULAR; INTRAVENOUS EVERY 30 MIN PRN
Status: DISCONTINUED | OUTPATIENT
Start: 2017-06-02 | End: 2017-06-02

## 2017-06-02 RX ORDER — SODIUM CHLORIDE, SODIUM LACTATE, POTASSIUM CHLORIDE, CALCIUM CHLORIDE 600; 310; 30; 20 MG/100ML; MG/100ML; MG/100ML; MG/100ML
INJECTION, SOLUTION INTRAVENOUS CONTINUOUS
Status: DISCONTINUED | OUTPATIENT
Start: 2017-06-02 | End: 2017-06-02 | Stop reason: HOSPADM

## 2017-06-02 RX ORDER — ONDANSETRON 4 MG/1
4 TABLET, ORALLY DISINTEGRATING ORAL EVERY 30 MIN PRN
Status: DISCONTINUED | OUTPATIENT
Start: 2017-06-02 | End: 2017-06-02

## 2017-06-02 RX ORDER — LIDOCAINE HYDROCHLORIDE 20 MG/ML
INJECTION, SOLUTION INFILTRATION; PERINEURAL PRN
Status: DISCONTINUED | OUTPATIENT
Start: 2017-06-02 | End: 2017-06-02

## 2017-06-02 RX ORDER — SODIUM CHLORIDE, SODIUM LACTATE, POTASSIUM CHLORIDE, CALCIUM CHLORIDE 600; 310; 30; 20 MG/100ML; MG/100ML; MG/100ML; MG/100ML
INJECTION, SOLUTION INTRAVENOUS CONTINUOUS
Status: DISCONTINUED | OUTPATIENT
Start: 2017-06-02 | End: 2017-06-02

## 2017-06-02 RX ORDER — FENTANYL CITRATE 50 UG/ML
INJECTION, SOLUTION INTRAMUSCULAR; INTRAVENOUS PRN
Status: DISCONTINUED | OUTPATIENT
Start: 2017-06-02 | End: 2017-06-02

## 2017-06-02 RX ORDER — ONDANSETRON 4 MG/1
4 TABLET, ORALLY DISINTEGRATING ORAL EVERY 6 HOURS PRN
Status: DISCONTINUED | OUTPATIENT
Start: 2017-06-02 | End: 2017-06-02 | Stop reason: HOSPADM

## 2017-06-02 RX ORDER — METOCLOPRAMIDE HYDROCHLORIDE 5 MG/ML
5 INJECTION INTRAMUSCULAR; INTRAVENOUS EVERY 6 HOURS PRN
Status: DISCONTINUED | OUTPATIENT
Start: 2017-06-02 | End: 2017-06-02 | Stop reason: HOSPADM

## 2017-06-02 RX ORDER — PROCHLORPERAZINE 25 MG
12.5 SUPPOSITORY, RECTAL RECTAL EVERY 12 HOURS PRN
Status: DISCONTINUED | OUTPATIENT
Start: 2017-06-02 | End: 2017-06-02 | Stop reason: HOSPADM

## 2017-06-02 RX ORDER — MEPERIDINE HYDROCHLORIDE 25 MG/ML
12.5 INJECTION INTRAMUSCULAR; INTRAVENOUS; SUBCUTANEOUS
Status: DISCONTINUED | OUTPATIENT
Start: 2017-06-02 | End: 2017-06-02 | Stop reason: HOSPADM

## 2017-06-02 RX ORDER — ONDANSETRON 2 MG/ML
INJECTION INTRAMUSCULAR; INTRAVENOUS PRN
Status: DISCONTINUED | OUTPATIENT
Start: 2017-06-02 | End: 2017-06-02

## 2017-06-02 RX ORDER — ONDANSETRON 2 MG/ML
4 INJECTION INTRAMUSCULAR; INTRAVENOUS EVERY 6 HOURS PRN
Status: DISCONTINUED | OUTPATIENT
Start: 2017-06-02 | End: 2017-06-02 | Stop reason: HOSPADM

## 2017-06-02 RX ORDER — NALOXONE HYDROCHLORIDE 0.4 MG/ML
.1-.4 INJECTION, SOLUTION INTRAMUSCULAR; INTRAVENOUS; SUBCUTANEOUS
Status: DISCONTINUED | OUTPATIENT
Start: 2017-06-02 | End: 2017-06-02 | Stop reason: HOSPADM

## 2017-06-02 RX ORDER — PROCHLORPERAZINE MALEATE 5 MG
5 TABLET ORAL EVERY 6 HOURS PRN
Status: DISCONTINUED | OUTPATIENT
Start: 2017-06-02 | End: 2017-06-02 | Stop reason: HOSPADM

## 2017-06-02 RX ORDER — FENTANYL CITRATE 50 UG/ML
25-50 INJECTION, SOLUTION INTRAMUSCULAR; INTRAVENOUS EVERY 5 MIN PRN
Status: DISCONTINUED | OUTPATIENT
Start: 2017-06-02 | End: 2017-06-02

## 2017-06-02 RX ORDER — EPHEDRINE SULFATE 50 MG/ML
INJECTION, SOLUTION INTRAMUSCULAR; INTRAVENOUS; SUBCUTANEOUS PRN
Status: DISCONTINUED | OUTPATIENT
Start: 2017-06-02 | End: 2017-06-02

## 2017-06-02 RX ORDER — FENTANYL CITRATE 50 UG/ML
25-50 INJECTION, SOLUTION INTRAMUSCULAR; INTRAVENOUS
Status: DISCONTINUED | OUTPATIENT
Start: 2017-06-02 | End: 2017-06-02

## 2017-06-02 RX ORDER — METOCLOPRAMIDE 5 MG/1
5 TABLET ORAL EVERY 6 HOURS PRN
Status: DISCONTINUED | OUTPATIENT
Start: 2017-06-02 | End: 2017-06-02 | Stop reason: HOSPADM

## 2017-06-02 RX ORDER — CITRIC ACID/SODIUM CITRATE 334-500MG
SOLUTION, ORAL ORAL PRN
Status: DISCONTINUED | OUTPATIENT
Start: 2017-06-02 | End: 2017-06-02

## 2017-06-02 RX ORDER — PROPOFOL 10 MG/ML
INJECTION, EMULSION INTRAVENOUS PRN
Status: DISCONTINUED | OUTPATIENT
Start: 2017-06-02 | End: 2017-06-02

## 2017-06-02 RX ORDER — HYDROMORPHONE HYDROCHLORIDE 1 MG/ML
.3-.5 INJECTION, SOLUTION INTRAMUSCULAR; INTRAVENOUS; SUBCUTANEOUS EVERY 5 MIN PRN
Status: DISCONTINUED | OUTPATIENT
Start: 2017-06-02 | End: 2017-06-02 | Stop reason: HOSPADM

## 2017-06-02 RX ORDER — IODIXANOL 320 MG/ML
150 INJECTION, SOLUTION INTRAVASCULAR ONCE
Status: COMPLETED | OUTPATIENT
Start: 2017-06-02 | End: 2017-06-02

## 2017-06-02 RX ADMIN — PROPOFOL 50 MG: 10 INJECTION, EMULSION INTRAVENOUS at 15:36

## 2017-06-02 RX ADMIN — SODIUM CITRATE AND CITRIC ACID MONOHYDRATE 30 ML: 500; 334 SOLUTION ORAL at 13:45

## 2017-06-02 RX ADMIN — ONDANSETRON 4 MG: 2 INJECTION INTRAMUSCULAR; INTRAVENOUS at 13:45

## 2017-06-02 RX ADMIN — FENTANYL CITRATE 50 MCG: 50 INJECTION, SOLUTION INTRAMUSCULAR; INTRAVENOUS at 14:00

## 2017-06-02 RX ADMIN — PROPOFOL 30 MG: 10 INJECTION, EMULSION INTRAVENOUS at 15:41

## 2017-06-02 RX ADMIN — IODIXANOL 70 ML: 320 INJECTION, SOLUTION INTRAVASCULAR at 15:42

## 2017-06-02 RX ADMIN — ROCURONIUM BROMIDE 10 MG: 10 INJECTION INTRAVENOUS at 14:54

## 2017-06-02 RX ADMIN — SODIUM CHLORIDE, POTASSIUM CHLORIDE, SODIUM LACTATE AND CALCIUM CHLORIDE: 600; 310; 30; 20 INJECTION, SOLUTION INTRAVENOUS at 13:45

## 2017-06-02 RX ADMIN — GLYCOPYRROLATE 0.2 MG: 0.2 INJECTION, SOLUTION INTRAMUSCULAR; INTRAVENOUS at 14:54

## 2017-06-02 RX ADMIN — SUGAMMADEX 100 MG: 100 INJECTION, SOLUTION INTRAVENOUS at 15:34

## 2017-06-02 RX ADMIN — Medication 10 MG: at 14:26

## 2017-06-02 RX ADMIN — Medication 10 MG: at 14:39

## 2017-06-02 RX ADMIN — LIDOCAINE HYDROCHLORIDE 60 MG: 20 INJECTION, SOLUTION INFILTRATION; PERINEURAL at 14:00

## 2017-06-02 RX ADMIN — MIDAZOLAM HYDROCHLORIDE 2 MG: 1 INJECTION, SOLUTION INTRAMUSCULAR; INTRAVENOUS at 13:45

## 2017-06-02 RX ADMIN — PHENYLEPHRINE HYDROCHLORIDE 50 MCG: 10 INJECTION, SOLUTION INTRAMUSCULAR; INTRAVENOUS; SUBCUTANEOUS at 15:15

## 2017-06-02 RX ADMIN — SODIUM CHLORIDE, POTASSIUM CHLORIDE, SODIUM LACTATE AND CALCIUM CHLORIDE: 600; 310; 30; 20 INJECTION, SOLUTION INTRAVENOUS at 15:39

## 2017-06-02 RX ADMIN — ROCURONIUM BROMIDE 40 MG: 10 INJECTION INTRAVENOUS at 14:00

## 2017-06-02 RX ADMIN — PROPOFOL 120 MG: 10 INJECTION, EMULSION INTRAVENOUS at 14:00

## 2017-06-02 ASSESSMENT — VISUAL ACUITY
OU: NORMAL ACUITY

## 2017-06-02 ASSESSMENT — ENCOUNTER SYMPTOMS: APNEA: 0

## 2017-06-02 NOTE — ANESTHESIA PREPROCEDURE EVALUATION
Anesthesia Evaluation    ROS/Med Hx    No history of anesthetic complications  (-) malignant hyperthermia and tuberculosis  Comments: Met with Renée and her . She has been NPO for:  Procedure(s):  ANESTHESIA OUT OF OR - spinal angiogram to look for vascular anomaly for source of bleeding for chronic headaches as noted in preop note  Past Medical History:  : Arthritis      Comment: hand   Gastroesophageal reflux disease - well controlled  : Hypertension   Superficial siderosis of central nervous system    Past Surgical History:   CHOLECYSTECTOMY  : HYSTERECTOMY   KNEE SURGERY      Comment: right knee arthroscopy   SHOULDER SURGERY      Comment: right rotator cuff and bicep repair    ROS - no history of asthma, MI, CHF, coagulopathy or DVT. She does have post nasal drip and is obese. No history of sleep apnea. She opens her mouth well and her airway appears feasible. Dentition stable.     Cardiovascular Findings   (+) hypertension,   Comments: EKG - NSR - FIRST DEGREE AV BLOCK; LAD; PACs    Neuro Findings   Comments: Brain bleeds with headaches    Pulmonary Findings   (-) asthma and apnea    HENT Findings - negative HENT ROS    Skin Findings - negative skin ROS      GI/Hepatic/Renal Findings   (+) GERD    GERD is well controlled    Endocrine/Metabolic Findings - negative ROS      Genetic/Syndrome Findings - negative genetics/syndromes ROS    Hematology/Oncology Findings - negative hematology/oncology ROS    Additional Notes  Prescriptions Prior to Admission:  amLODIPine (NORVASC) 2.5 MG tablet, Take 2.5 mg by mouth daily, Disp: , Rfl: , 6/2/2017 at 0500  Cholecalciferol (VITAMIN D3) 2000 UNITS CAPS, Take 4,000 Units by mouth daily, Disp: , Rfl: , Past Week at Unknown time  hydrochlorothiazide (HYDRODIURIL) 25 MG tablet, Take 25 mg by mouth daily, Disp: , Rfl: , 6/1/2017 at 0830  Multiple Vitamin (MULTI-VITAMINS) TABS, Take 1 tablet by mouth daily, Disp: , Rfl: , Past Week at Unknown time  omeprazole  "(PRILOSEC) 20 MG CR capsule, Take 20 mg by mouth daily, Disp: , Rfl: , 6/1/2017 at 0800  oxyCODONE-acetaminophen (PERCOCET) 5-325 MG per tablet, Take 1-2 tablets by mouth daily as needed, Disp: , Rfl: , Past Week at Unknown time  propranolol (INDERAL LA) 80 MG 24 hr capsule, Take 80 mg by mouth daily, Disp: , Rfl: , 6/2/2017 at 0500      Current Facility-Administered Medications:      lidocaine 1 % 1 mL, 1 mL, Other, Q1H PRN, Isacc Child MD     lidocaine (LMX4) kit, , Topical, Q1H PRN, Isacc Child MD     sodium chloride (PF) 0.9% PF flush 3 mL, 3 mL, Intracatheter, Q1H PRDanyell DE JESUS Huseyin G, MD     sodium chloride (PF) 0.9% PF flush 3 mL, 3 mL, Intracatheter, Q8H, Isacc Child MD     0.9% sodium chloride infusion, , Intravenous, Continuous, Isacc Child MD     medication instruction, , Does not apply, Continuous PRN, Isacc Child MD     lactated ringers infusion, , Intravenous, Continuous, Orion Deluna MD     lidocaine 1 % 1 mL, 1 mL, Other, Q1H PRN, Orion Deluna MD     lidocaine (LMX4) kit, , Topical, Q1H PRN, Orion Deluna MD     sodium chloride (PF) 0.9% PF flush 3 mL, 3 mL, Intracatheter, Q1H PRN, Oiron Deluna MD     sodium chloride (PF) 0.9% PF flush 3 mL, 3 mL, Intracatheter, Q8H, Orion Deluna MD    Allergies:   -- Codeine -- Other (See Comments)    --  Chest pain        Physical Exam      Airway   Mallampati: I  TM distance: >3 FB  Neck ROM: full    Dental   Comment: Stable as noted    Cardiovascular   Rhythm and rate: regular and normal      Pulmonary    breath sounds clear to auscultation    Other findings: /77 (Cuff Size: Adult Regular)  Temp 36.7  C (98  F) (Oral)  Resp 12  Ht 1.549 m (5' 1\")  Wt 74.8 kg (164 lb 14.5 oz)  SpO2 100%  BMI 31.16 kg/m2      Anesthesia Plan      History & Physical Review  History and physical reviewed and following examination, relevant changes include: also conducted a medical interview with Renée and her spouse    ASA Status:  3 . "    NPO Status:  > 6 hours    Plan for General and ETT with Intravenous and Propofol induction. Maintenance will be Balanced.    PONV prophylaxis:  Ondansetron (or other 5HT-3) and Droperidol or Haldol  She requests GA. Procedures and risks including related to her hypertension explained. She and spouse understood and consented. Qs answered.       Postoperative Care  Postoperative pain management:  IV analgesics.      Consents  Anesthetic plan, risks, benefits and alternatives discussed with:  Patient and Spouse.  Use of blood products discussed: No .   .

## 2017-06-02 NOTE — PROGRESS NOTES
Pt to IR with anesthesia team, awake, neuros intact with headache which per pt is her norm.  Cares and monitoring per anesthesia team.  Pt intubated at 1400; VSS. Pt tolerated spinal angiogram with anesthesia team handling cares and monitoring.  Straight catheterized pt postprocedure for 150 ml clear straw colored urine.  Anesthesia team extubated pt after moving her to transport cart.  To PACU with anesthesia team.

## 2017-06-02 NOTE — IP AVS SNAPSHOT
Post Anesthesia Care Unit [address] [address]    Phone:  [phone]                                       After Visit Summary   6/2/2017    Renée Almeida    MRN: [id number]           After Visit Summary Signature Page     I have received my discharge instructions, and my questions have been answered. I have discussed any challenges I see with this plan with the nurse or doctor.    ..........................................................................................................................................  Patient/Patient Representative Signature      ..........................................................................................................................................  Patient Representative Print Name and Relationship to Patient    ..................................................               ................................................  Date                                            Time    ..........................................................................................................................................  Reviewed by Signature/Title    ...................................................              ..............................................  Date                                                            Time

## 2017-06-02 NOTE — OR NURSING
Dr. Child at bedside to assess patient, perform neuro check.  Okay to transfer to phase II and DC home today when appropriate.

## 2017-06-02 NOTE — IP AVS SNAPSHOT
MRN:[id number]                      After Visit Summary   6/2/2017    Renée Almeida    MRN: [id number]           Thank you!     Thank you for choosing [place] [facility] for your care. Our goal is always to provide you with excellent care. Hearing back from our patients is one way we can continue to improve our services. Please take a few minutes to complete the written survey that you may receive in the mail after you visit with us. Thank you!        Patient Information     Date Of Birth          1950        About your hospital stay     You were admitted on:  June 2, 2017 You last received care in the:  Post Anesthesia Care Unit [facility]    You were discharged on:  June 2, 2017       Who to Call     For medical emergencies, please call 911.  For non-urgent questions about your medical care, please call your primary care provider or clinic, [phone]  For questions related to your surgery, please call your surgery clinic        Attending Provider     Provider Specialty    Jourdan Irvin MD Radiology       Primary Care Provider Office Phone # Fax #    Mark Anthony Muhammad [phone]      Further instructions from your care team       [facility]  Same-Day Surgery   Adult Discharge Orders & Instructions     For 24 hours after surgery    1. Get plenty of rest.  A responsible adult must stay with you for at least 24 hours after you leave the hospital.   2. Do not drive or use heavy equipment.  If you have weakness or tingling, don't drive or use heavy equipment until this feeling goes away.  3. Do not drink alcohol.  4. Avoid strenuous or risky activities.  Ask for help when climbing stairs.   5. You may feel lightheaded.  IF so, sit for a few minutes before standing.  Have someone help you get up.   6. If you have nausea (feel sick to your stomach): Drink only clear liquids such as apple juice, ginger ale, broth or 7-Up.  Rest may  "also help.  Be sure to drink enough fluids.  Move to a regular diet as you feel able.  7. You may have a slight fever. Call the doctor if your fever is over 100.5  F (37.7 C) (taken under the tongue) or lasts longer than 24 hours.  8. You may have a dry mouth, a sore throat, muscle aches or trouble sleeping.  These should go away after 24 hours.  9. Do not make important or legal decisions.   Call your doctor for any of the followin.  Signs of infection (fever, growing tenderness at the surgery site, a large amount of drainage or bleeding, severe pain, foul-smelling drainage, redness, swelling).    2. It has been over 8 to 10 hours since surgery and you are still not able to urinate (pass water).    3.  Headache for over 24 hours.    To contact a doctor, call        [phone] and ask for the resident on call for Neurology/Neurosurgery  (answered 24 hours a day)      Emergency Department:    [facility]: [phone]       (TTY for hearing impaired: [phone])        Additional Information     If you use hormonal birth control (such as the pill, patch, ring or implants): You'll need a second form of birth control for 7 days (condoms, a diaphragm or contraceptive foam). While in the hospital, you received a medicine called Bridion. Your normal birth control will not work as well for a week after taking this medicine.          Pending Results     Date and Time Order Name Status Description    2017 1317 IR Carotid Cerebral Angiogram Bilateral In process             Admission Information     Date & Time Provider Department Dept. Phone    2017 Jourdan Irvin MD Post Anesthesia Care Unit [facility] [phone]      Your Vitals Were     Blood Pressure Temperature Respirations Height Weight Pulse Oximetry    116/59 98.6  F (37  C) 14 1.549 m (5' 1\") 74.8 kg (164 lb 14.5 oz) 93%    BMI (Body Mass Index)                   31.16 kg/m2           MyChart Information     MyChart gives " you secure access to your electronic health record. If you see a primary care provider, you can also send messages to your care team and make appointments. If you have questions, please call your primary care clinic.  If you do not have a primary care provider, please call [phone] and they will assist you.        Care EveryWhere ID     This is your Care EveryWhere ID. This could be used by other organizations to access your [facility] medical records  [id number]           Review of your medicines      UNREVIEWED medicines. Ask your doctor about these medicines        Dose / Directions    amLODIPine 2.5 MG tablet   Commonly known as:  NORVASC        Dose:  2.5 mg   Take 2.5 mg by mouth daily   Refills:  0       hydrochlorothiazide 25 MG tablet   Commonly known as:  HYDRODIURIL        Dose:  25 mg   Take 25 mg by mouth daily   Refills:  0       MULTI-VITAMINS Tabs        Dose:  1 tablet   Take 1 tablet by mouth daily   Refills:  0       omeprazole 20 MG CR capsule   Commonly known as:  priLOSEC        Dose:  20 mg   Take 20 mg by mouth daily   Refills:  0       oxyCODONE-acetaminophen 5-325 MG per tablet   Commonly known as:  PERCOCET        Dose:  1-2 tablet   Take 1-2 tablets by mouth daily as needed   Refills:  0       propranolol 80 MG 24 hr capsule   Commonly known as:  INDERAL LA        Dose:  80 mg   Take 80 mg by mouth daily   Refills:  0       vitamin D3 2000 UNITS Caps        Dose:  4000 Units   Take 4,000 Units by mouth daily   Refills:  0                Protect others around you: Learn how to safely use, store and throw away your medicines at www.disposemymeds.org.             Medication List: This is a list of all your medications and when to take them. Check marks below indicate your daily home schedule. Keep this list as a reference.      Medications           Morning Afternoon Evening Bedtime As Needed    amLODIPine 2.5 MG tablet   Commonly known as:  NORVASC   Take 2.5 mg by mouth daily                                 hydrochlorothiazide 25 MG tablet   Commonly known as:  HYDRODIURIL   Take 25 mg by mouth daily                                MULTI-VITAMINS Tabs   Take 1 tablet by mouth daily                                omeprazole 20 MG CR capsule   Commonly known as:  priLOSEC   Take 20 mg by mouth daily                                oxyCODONE-acetaminophen 5-325 MG per tablet   Commonly known as:  PERCOCET   Take 1-2 tablets by mouth daily as needed                                propranolol 80 MG 24 hr capsule   Commonly known as:  INDERAL LA   Take 80 mg by mouth daily                                vitamin D3 2000 UNITS Caps   Take 4,000 Units by mouth daily

## 2017-06-02 NOTE — PROCEDURES
[facility], [place]     Endovascular Surgical Neuroradiology Post-Procedure Note    Pre-Procedure Diagnosis:  Cortical superficial siderosis  Post-Procedure Diagnosis:  Cortical superficial siderosis    Procedure(s):  Diagnostic spinal angiography    Findings:  No vascular malformation to account for the intracranial hemosiderin deposition.    Primary Surgeon:  Dr. Jourdan Irvin  Secondary Surgeon:  Not applicable  Secondary Surgeon Review:  None  Fellow:  Osito Child  Additional Assistants:      Prior to the start of the procedure and with procedural staff participation, I verbally confirmed: the patient s identity using two indicators, relevant allergies, that the procedure was appropriate and matched the consent or emergent situation, and that the correct equipment/implants were available. Immediately prior to starting the procedure I conducted the Time Out with the procedural staff and re-confirmed the patient s name, procedure, and site/side. (The Joint Commission universal protocol was followed.)  Yes    PRU value: Not applicable    Anesthesia:  Performed by Anesthesia  Medications:  GETA by anesthesiology  Puncture site:  Right Femoral Artery    Fluoroscopy time (minutes):  39  Radiation dose (mGy):  1351    Contrast amount (mL):  70     TICI score:  Not applicable  Deviation from protocol:  Not applicable    Estimated blood loss (mL):  Minimal    IA tPA infusion start time:  Not applicable  Time of first pass of mechanical reperfusion device:  Not applicable    Closure:  Device  Procedure complications:  No immediate complications    Disposition:  Home after recovery.        Sedation Post-Procedure Summary    Performed under GETA. Patient tolerated the procedure well.    Isacc Child  Pager:  [phone]

## 2017-06-02 NOTE — PROGRESS NOTES
[facility], [place]     Endovascular Surgical Neuroradiology Pre-Procedure Note      HPI:  Renée Almeida is a 66 year old female with history of intractable headaches. Her work-up MRI showed superficial cortical siderosis over the cerebellar folia. No vascular malformation was identified in the MRA head and neck. Today we are going to perform a diagnostic spinal angiography to assess for any arteriovenous malformation or arteriovenous fistula that might be the cause of intracranial hemosiderin deposition.     Medical History:  Past Medical History:   Diagnosis Date     Arthritis     hand     Gastroesophageal reflux disease      Hypertension      Superficial siderosis of central nervous system        Surgical History:  Past Surgical History:   Procedure Laterality Date     CHOLECYSTECTOMY       HYSTERECTOMY       KNEE SURGERY      right knee arthroscopy     SHOULDER SURGERY      right rotator cuff and bicep repair       Family History:  History reviewed. No pertinent family history.    Social History:  Social History     Social History     Marital status:      Spouse name: N/A     Number of children: N/A     Years of education: N/A     Occupational History     Not on file.     Social History Main Topics     Smoking status: Never Smoker     Smokeless tobacco: Never Used     Alcohol use No     Drug use: No     Sexual activity: Yes     Partners: Male     Other Topics Concern     Not on file     Social History Narrative       Allergies:  Allergies   Allergen Reactions     Codeine Other (See Comments)     Chest pain       Is there a contrast allergy?  No    Medications:  Prescriptions Prior to Admission   Medication Sig Dispense Refill Last Dose     amLODIPine (NORVASC) 2.5 MG tablet Take 2.5 mg by mouth daily   6/2/2017 at 0500     Cholecalciferol (VITAMIN D3) 2000 UNITS CAPS Take 4,000 Units by mouth daily   Past Week at Unknown time     hydrochlorothiazide (HYDRODIURIL) 25 MG  tablet Take 25 mg by mouth daily   6/1/2017 at 0830     Multiple Vitamin (MULTI-VITAMINS) TABS Take 1 tablet by mouth daily   Past Week at Unknown time     omeprazole (PRILOSEC) 20 MG CR capsule Take 20 mg by mouth daily   6/1/2017 at 0800     oxyCODONE-acetaminophen (PERCOCET) 5-325 MG per tablet Take 1-2 tablets by mouth daily as needed   Past Week at Unknown time     propranolol (INDERAL LA) 80 MG 24 hr capsule Take 80 mg by mouth daily   6/2/2017 at 0500   .    ROS:  C: NEGATIVE for fever, chills, change in weight  E/M: NEGATIVE for ear, mouth and throat problems  R: NEGATIVE for significant cough or SOB  CV: NEGATIVE for chest pain, palpitations or peripheral edema    PHYSICAL EXAMINATION  Vital Signs:  B/P: 128/72,  T: 98,  P: Data Unavailable,  R: 14    Cardio:  RRR  Pulmonary:  no respiratory distress  Abdomen:  soft, non-tender, non-distended    Neurologic  Mental Status:  fully alert, attentive and oriented, follows commands, speech clear and fluent  Cranial Nerves:  visual fields intact, PERRL, EOMI with normal smooth pursuit, facial sensation intact and symmetric, facial movements symmetric, hearing not formally tested but intact to conversation, palate elevation symmetric and uvula midline, no dysarthria, shoulder shrug strong bilaterally, tongue protrusion midline  Motor:  no abnormal movements, normal tone throughout, normal muscle bulk, no pronator drift, normal and symmetric rapid finger tapping, able to move all limbs spontaneously, strength 5/5 throughout upper and lower extremities  Sensory:  intact to light touch x 4  Coordination:  FNF intact without dysmetria    Pre-procedure National Institutes of Health Stroke Scale:   Not applicable    LABS  (most recent Cr, BUN, GFR, PLT, INR, PTT within the past 7 days):    Recent Labs  Lab 06/02/17  1055   CR 0.68   GFRESTIMATED 86   GFRESTBLACK >90African American GFR Calc      INR 1.01   PTT 26        Platelet Function P2Y12 (PRU):  Not  applicable      ASSESSMENT: Superficial cortical siderosis of unknown etiology. Possible spinal vascular malformation that might have caused remote minute subarachnoid hemorrhages over time.    PLAN: Diagnostic spinal angiography.        PRE-PROCEDURE SEDATION ASSESSMENT     Pre-Procedure Sedation Assessment done at 1200.    Expected Level:  Deep Sedation, GETA by the anesthesiology department    Indication:  Sedation is required to allow for neurointerventional procedure.    Consent obtained from patient after discussing the risks, benefits and alternatives.     PO Intake:  Appropriately NPO for procedure    ASA Class:  Class 1 - HEALTHY PATIENT    Mallampati:  Grade 2:  Soft palate, base of uvula, tonsillar pillars, and portion of posterior pharyngeal wall visible    History and physical reviewed and no updates needed. I have reviewed the lab findings, diagnostic data, medications, and the plan for sedation. I have determined this patient to be an appropriate candidate for the planned sedation and procedure and have reassessed the patient IMMEDIATELY PRIOR to sedation and procedure.    Patient was discussed with the Attending, Dr. Irvin, who agrees with the plan.    Isacc Child   Pager: [phone]

## 2017-06-02 NOTE — DISCHARGE INSTRUCTIONS
[facility]  Same-Day Surgery   Adult Discharge Orders & Instructions     For 24 hours after surgery    1. Get plenty of rest.  A responsible adult must stay with you for at least 24 hours after you leave the hospital.   2. Do not drive or use heavy equipment.  If you have weakness or tingling, don't drive or use heavy equipment until this feeling goes away.  3. Do not drink alcohol.  4. Avoid strenuous or risky activities.  Ask for help when climbing stairs.   5. You may feel lightheaded.  IF so, sit for a few minutes before standing.  Have someone help you get up.   6. If you have nausea (feel sick to your stomach): Drink only clear liquids such as apple juice, ginger ale, broth or 7-Up.  Rest may also help.  Be sure to drink enough fluids.  Move to a regular diet as you feel able.  7. You may have a slight fever. Call the doctor if your fever is over 100.5  F (37.7 C) (taken under the tongue) or lasts longer than 24 hours.  8. You may have a dry mouth, a sore throat, muscle aches or trouble sleeping.  These should go away after 24 hours.  9. Do not make important or legal decisions.   Call your doctor for any of the followin.  Signs of infection (fever, growing tenderness at the surgery site, a large amount of drainage or bleeding, severe pain, foul-smelling drainage, redness, swelling).    2. It has been over 8 to 10 hours since surgery and you are still not able to urinate (pass water).    3.  Headache for over 24 hours.    To contact a doctor, call        [phone] and ask for the resident on call for Neurology/Neurosurgery  (answered 24 hours a day)      Emergency Department:    [facility]: [phone]       (TTY for hearing impaired: [phone])

## 2017-06-02 NOTE — ANESTHESIA POSTPROCEDURE EVALUATION
Patient: Renée Almeida    Procedure(s):  Anesthesia Offsite Spinal Angiogram @1200     Diagnosis:Superficial Siderosis Central Nerve System   Diagnosis Additional Information: No value filed.    Anesthesia Type:  General, ETT    Note:  Anesthesia Post Evaluation    Patient location during evaluation: PACU  Patient participation: Able to fully participate in evaluation  Level of consciousness: awake  Pain management: satisfactory to patient  Airway patency: patent  Cardiovascular status: acceptable  Respiratory status: acceptable  Hydration status: acceptable  PONV: none     Anesthetic complications: None          Last vitals:  Vitals:    06/02/17 1354 06/02/17 1401 06/02/17 1559   BP: 128/72 126/68 121/68   Resp: 14 14 14   Temp:   36.3  C (97.4  F)   SpO2: 97% 100%          Electronically Signed By: Tyson Flaherty MD  June 2, 2017  4:29 PM

## 2017-06-02 NOTE — ANESTHESIA POSTPROCEDURE EVALUATION
Patient: Renée Almeida    Procedure(s):  Anesthesia Offsite Spinal Angiogram @1200     Diagnosis:Superficial Siderosis Central Nerve System   Diagnosis Additional Information: No value filed.    Anesthesia Type:  General, ETT    Note:  Anesthesia Post Evaluation    Patient location during evaluation: PACU  Patient participation: Able to fully participate in evaluation  Level of consciousness: awake and alert  Pain management: adequate  Airway patency: patent  Cardiovascular status: hemodynamically stable  Respiratory status: acceptable  Hydration status: stable  PONV: none     Anesthetic complications: None          Last vitals:  Vitals:    06/02/17 1354 06/02/17 1401 06/02/17 1559   BP: 128/72 126/68 121/68   Resp: 14 14 14   Temp:   36.3  C (97.4  F)   SpO2: 97% 100%          Electronically Signed By: Severo Waggoner MD  June 2, 2017  4:08 PM

## 2017-06-02 NOTE — ANESTHESIA CARE TRANSFER NOTE
Patient: Renée Almeida    Procedure(s):  Anesthesia Offsite Spinal Angiogram @1200     Diagnosis: Superficial Siderosis Central Nerve System   Diagnosis Additional Information: No value filed.    Anesthesia Type:   General, ETT     Note:  Airway :Face Mask  Patient transferred to:PACU  Comments: Spont resp, VSS, report to RN      Vitals: (Last set prior to Anesthesia Care Transfer)    CRNA VITALS  6/2/2017 1522 - 6/2/2017 1600      6/2/2017             Pulse: 67    SpO2: 94 %    Resp Rate (set): 10                Electronically Signed By: SIMONE Garcia CRNA  June 2, 2017  4:00 PM

## 2017-06-05 ENCOUNTER — CARE COORDINATION (OUTPATIENT)
Dept: NEUROLOGY | Facility: CLINIC | Age: 67
End: 2017-06-05

## 2017-06-05 NOTE — PROGRESS NOTES
Neuro-Interventional Discharge Coordination Note     Responsible Attending physician: Dr. Irvin     Operation performed: Diagnostic spinal angiography to assess Cortical superficial siderosis. (Findings:  No vascular malformation to account for the intracranial hemosiderin deposition.)    Date of Discharge: 6/2/17    Discharge to: Home    Current Contact number: [phone]    Current Status: Patient states she is doing ok. Denies redness, drainage, bleeding, pain, swelling at groin puncture site. Patient is aware of the plan. Patient has my contact information and was encouraged to call with questions/concerns.     Plan: No vascular malformation found. Follow-up with Dr. Perez.

## 2017-06-08 ENCOUNTER — OFFICE VISIT (OUTPATIENT)
Dept: NEUROLOGY | Facility: CLINIC | Age: 67
End: 2017-06-08

## 2017-06-08 VITALS — HEIGHT: 61 IN | SYSTOLIC BLOOD PRESSURE: 145 MMHG | DIASTOLIC BLOOD PRESSURE: 73 MMHG | HEART RATE: 61 BPM

## 2017-06-08 DIAGNOSIS — G96.89 SUPERFICIAL SIDEROSIS OF CENTRAL NERVOUS SYSTEM: Primary | ICD-10-CM

## 2017-06-08 DIAGNOSIS — R79.0 ABNORMAL LEVEL OF BLOOD MINERAL: ICD-10-CM

## 2017-06-08 DIAGNOSIS — G96.89 SUPERFICIAL SIDEROSIS OF CENTRAL NERVOUS SYSTEM: ICD-10-CM

## 2017-06-08 DIAGNOSIS — D50.8 OTHER IRON DEFICIENCY ANEMIA: ICD-10-CM

## 2017-06-08 LAB
BASOPHILS # BLD AUTO: 0 10E9/L (ref 0–0.2)
BASOPHILS NFR BLD AUTO: 0.5 %
DIFFERENTIAL METHOD BLD: NORMAL
EOSINOPHIL # BLD AUTO: 0.1 10E9/L (ref 0–0.7)
EOSINOPHIL NFR BLD AUTO: 1.5 %
ERYTHROCYTE [DISTWIDTH] IN BLOOD BY AUTOMATED COUNT: 12.2 % (ref 10–15)
FERRITIN SERPL-MCNC: 51 NG/ML (ref 8–252)
HCT VFR BLD AUTO: 39.2 % (ref 35–47)
HGB BLD-MCNC: 13 G/DL (ref 11.7–15.7)
IMM GRANULOCYTES # BLD: 0 10E9/L (ref 0–0.4)
IMM GRANULOCYTES NFR BLD: 0.2 %
IRON SERPL-MCNC: 59 UG/DL (ref 35–180)
LYMPHOCYTES # BLD AUTO: 2.4 10E9/L (ref 0.8–5.3)
LYMPHOCYTES NFR BLD AUTO: 38.7 %
MCH RBC QN AUTO: 30.6 PG (ref 26.5–33)
MCHC RBC AUTO-ENTMCNC: 33.2 G/DL (ref 31.5–36.5)
MCV RBC AUTO: 92 FL (ref 78–100)
MONOCYTES # BLD AUTO: 0.6 10E9/L (ref 0–1.3)
MONOCYTES NFR BLD AUTO: 9.3 %
NEUTROPHILS # BLD AUTO: 3.1 10E9/L (ref 1.6–8.3)
NEUTROPHILS NFR BLD AUTO: 49.8 %
NRBC # BLD AUTO: 0 10*3/UL
NRBC BLD AUTO-RTO: 0 /100
PLATELET # BLD AUTO: 331 10E9/L (ref 150–450)
RBC # BLD AUTO: 4.25 10E12/L (ref 3.8–5.2)
WBC # BLD AUTO: 6.2 10E9/L (ref 4–11)

## 2017-06-08 ASSESSMENT — PAIN SCALES - GENERAL: PAINLEVEL: WORST PAIN (10)

## 2017-06-08 NOTE — MR AVS SNAPSHOT
After Visit Summary   6/8/2017    Renée Almeida    MRN: [id number]           Patient Information     Date Of Birth          1950        Visit Information        Provider Department      6/8/2017 3:30 PM Jovanny Perez MD [facility] Neurology        Today's Diagnoses     Superficial siderosis of central nervous system    -  1    Other iron deficiency anemia        Abnormal level of blood mineral            Follow-ups after your visit        Follow-up notes from your care team     Return in about 3 months (around 9/8/2017).      Your next 10 appointments already scheduled     Jun 08, 2017  4:30 PM CDT   LAB with  LAB   [facility] Lab ([facility])    [address] [place] [id number] [phone]           Patient must bring picture ID.  Patient should be prepared to give a urine specimen  Please do not eat 10-12 hours before your appointment if you are coming in fasting for labs on lipids, cholesterol, or glucose (sugar).  Pregnant women should follow their Care Team instructions. Water with medications is okay. Do not drink coffee or other fluids.   If you have concerns about taking  your medications, please ask at office or if scheduling via [employer], send a message by clicking on Secure Messaging, Message Your Care Team.            Sep 05, 2017 10:00 AM CDT   (Arrive by 9:45 AM)   Return Visit with Jovanny Perez MD   [facility] Neurology ([facility])    [address] [place] [id number] [phone]              Future tests that were ordered for you today     Open Future Orders        Priority Expected Expires Ordered    Iron Routine  6/8/2018 6/8/2017    Ferritin Routine  6/9/2018 6/8/2017    CBC with platelets differential Routine  6/8/2018 6/8/2017            Who to contact     Please call your clinic at [phone] to:    Ask questions about your health    Make or cancel  "appointments    Discuss your medicines    Learn about your test results    Speak to your doctor   If you have compliments or concerns about an experience at your clinic, or if you wish to file a complaint, please contact [facility] Patient Relations at [phone] or email us at [email or web address]         Additional Information About Your Visit        [employer] Information     [facility] gives you secure access to your electronic health record. If you see a primary care provider, you can also send messages to your care team and make appointments. If you have questions, please call your primary care clinic.  If you do not have a primary care provider, please call [phone] and they will assist you.      [employer] is an electronic gateway that provides easy, online access to your medical records. With [employer], you can request a clinic appointment, read your test results, renew a prescription or communicate with your care team.     To access your existing account, please contact your [facility] or call [phone] for assistance.        Care EveryWhere ID     This is your Care EveryWhere ID. This could be used by other organizations to access your [facility] medical records  [id number]        Your Vitals Were     Pulse Height                61 1.549 m (5' 1\")           Blood Pressure from Last 3 Encounters:   06/08/17 145/73   06/02/17 128/68   05/23/17 141/59    Weight from Last 3 Encounters:   06/02/17 74.8 kg (164 lb 14.5 oz)   05/23/17 75 kg (165 lb 6.4 oz)   04/13/17 74.4 kg (164 lb)              We Performed the Following     IRON/IRON BINDING CAPACITY        Primary Care Provider Office Phone # Fax #    Mark Anthony Jb [phone]       [facility] [address]        Thank you!     Thank you for choosing [facility] NEUROLOGY  for your care. Our goal is always to provide you with excellent care. " Hearing back from our patients is one way we can continue to improve our services. Please take a few minutes to complete the written survey that you may receive in the mail after your visit with us. Thank you!             Your Updated Medication List - Protect others around you: Learn how to safely use, store and throw away your medicines at www.disposemymeds.org.          This list is accurate as of: 6/8/17  4:21 PM.  Always use your most recent med list.                   Brand Name Dispense Instructions for use    amLODIPine 2.5 MG tablet    NORVASC     Take 2.5 mg by mouth daily       hydrochlorothiazide 25 MG tablet    HYDRODIURIL     Take 25 mg by mouth daily       MULTI-VITAMINS Tabs      Take 1 tablet by mouth daily       omeprazole 20 MG CR capsule    priLOSEC     Take 20 mg by mouth daily       oxyCODONE-acetaminophen 5-325 MG per tablet    PERCOCET     Take 1-2 tablets by mouth daily as needed       propranolol 80 MG 24 hr capsule    INDERAL LA     Take 80 mg by mouth daily       vitamin D3 2000 UNITS Caps      Take 4,000 Units by mouth daily

## 2017-06-08 NOTE — LETTER
2017     RE: Renée Almeida   [address]  [place] [address]     Dear Colleague,    Thank you for referring your patient, Renée Almeida, to the [facility] NEUROLOGY at [facility]. Please see a copy of my visit note below.    2017        Mark Anthony Muhammad MD   [facility]    [address]   [place] [address]      Juordan rIvin MD   [facility]    [address]   [place] [address]      Dilma Concepcion MD   [facility]    [address] [place] [address]      RE: Renée Almeida    MRN: [id number]   : 1950              Dear Brandee Miranda and Jamey:        Ms. Almeida, who was seen for followup in the Neurology Clinic for her headaches, dizziness with a big positional component was reviewed.  She has undergone extensive workup for her superficial siderosis and recently performed spinal coronary angiography and this showed no arteriovenous malformation to account for her severe accumulation of iron in her arachnoid space.  A repeated MRI has confirmed this.      We have reviewed her case once more at conferences and also discussed with Dr. Aguilar.      We reviewed the use of chelation with deferiprone possibly 500 mg 3 times a day, which is being used and there have been a series of reports on trials of 10 individuals with superficial siderosis who apparently responded to this chelating agent over a long period of time.  We will be communicating with Dr. Rodriguez in [facility] who has developed a series and expertise in this.      So far as the patient, she is still fairly incapacitated with headaches and this sensation of fullness in her head, dizziness and pressure in the ears to the point she lies around a lot which gives her relief and has been using Percocet 5/325 one tablet with no relief of symptoms at all.  She is said to be allergic to  codeine, having chest pains with it in the past.      On her exam today, she looks uncomfortable.  Blood pressure is 145/73, pulse of 61.  Her fundus shows some blurring of the optic nerve, but no true papilledema, normal eye movements.  Reflex motor and sensory exams are normal.  Neck is fairly supple.      In summary, this patient has severe superficial siderosis with the headaches and no source of bleeding has been found.  Repeated studies including CT myelogram, spinal angiography and cerebral multiple studies.        We will start her on a course of iron chelation as has been reported to be helpful in some  cases and this will be done with the assistance of the Vascular Service at the [place].  The dose for deferiprone is 50 mg 3 times a day.  We have ordered some basic studies today, blood serum iron, iron binding capacity, platelets and CBC and will be calling her to start the process of using this medication.  We discussed with Dr. Beckett and he thinks the iron chelation can induce anemia and would need to supplement with iron. Coenzyme q and vit E 400 mg daily may be between trial. Will present at conference tomorrow.We also will be doing better pain control in the meantime while the chelation drug works.      We will follow through with her and her  who was present.        Sincerely,        Jovanny Perez MD      D: 2017 16:19   T: 2017 10:05   MT: angel    Name:     SHAWN MAJOR   MRN:      [id number]        Account:      [id number]   :      1950           Service Date: 2017    Document: [id number]

## 2017-06-09 NOTE — PROGRESS NOTES
2017        Mark Anthony Muhammad MD   [facility]    [address] [address]      Jourdan Vick Irvin MD   [facility]    [address]   [place] [address]      Dilma Concepcion MD   [facility]    [address] [place] [address]      RE: Renée Almeida    MRN: [id number]   : 1950              Dear Brandee Miranda and Jamey:        Ms. Almeida, who was seen for followup in the Neurology Clinic for her headaches, dizziness with a big positional component was reviewed.  She has undergone extensive workup for her superficial siderosis and recently performed spinal coronary angiography and this showed no arteriovenous malformation to account for her severe accumulation of iron in her arachnoid space.  A repeated MRI has confirmed this.      We have reviewed her case once more at conferences and also discussed with Dr. Aguilar.      We reviewed the use of chelation with deferiprone possibly 500 mg 3 times a day, which is being used and there have been a series of reports on trials of 10 individuals with superficial siderosis who apparently responded to this chelating agent over a long period of time.  We will be communicating with Dr. Rodriguez in [facility] who has developed a series and expertise in this.      So far as the patient, she is still fairly incapacitated with headaches and this sensation of fullness in her head, dizziness and pressure in the ears to the point she lies around a lot which gives her relief and has been using Percocet 5/325 one tablet with no relief of symptoms at all.  She is said to be allergic to codeine, having chest pains with it in the past.      On her exam today, she looks uncomfortable.  Blood pressure is 145/73, pulse of 61.  Her fundus shows some blurring of the optic nerve, but no true papilledema, normal eye movements.  Reflex motor and sensory exams are  normal.  Neck is fairly supple.      In summary, this patient has severe superficial siderosis with the headaches and no source of bleeding has been found.  Repeated studies including CT myelogram, spinal angiography and cerebral multiple studies.        We will start her on a course of iron chelation as has been reported to be helpful in some  cases and this will be done with the assistance of the Vascular Service at the [employer] [facility].  The dose for deferiprone is 50 mg 3 times a day.  We have ordered some basic studies today, blood serum iron, iron binding capacity, platelets and CBC and will be calling her to start the process of using this medication.  We discussed with Dr. Beckett and he thinks the iron chelation can induce anemia and would need to supplement with iron. Coenzyme q and vit E 400 mg daily may be between trial. Will present at conference tomorrow.We also will be doing better pain control in the meantime while the chelation drug works.      We will follow through with her and her  who was present.        Sincerely,        MD ALEX Poole MD             D: 2017 16:19   T: 2017 10:05   MT: tb      Name:     SHAWN MAJOR   MRN:      [id number]        Account:      [id number]   :      1950           Service Date: 2017      Document: [id number]

## 2017-06-16 DIAGNOSIS — G44.89 OTHER HEADACHE SYNDROME: Primary | ICD-10-CM

## 2017-06-16 RX ORDER — TRAMADOL HYDROCHLORIDE 100 MG/1
TABLET, EXTENDED RELEASE ORAL
Qty: 90 TABLET | Refills: 1 | Status: SHIPPED | OUTPATIENT
Start: 2017-06-16 | End: 2017-06-23 | Stop reason: DRUGHIGH

## 2017-06-23 ENCOUNTER — CARE COORDINATION (OUTPATIENT)
Dept: NEUROLOGY | Facility: CLINIC | Age: 67
End: 2017-06-23

## 2017-06-23 DIAGNOSIS — G44.221 CHRONIC TENSION-TYPE HEADACHE, INTRACTABLE: Primary | ICD-10-CM

## 2017-06-23 RX ORDER — TRAMADOL HYDROCHLORIDE 50 MG/1
TABLET ORAL
Qty: 180 TABLET | Refills: 3 | Status: SHIPPED | OUTPATIENT
Start: 2017-06-23 | End: 2018-04-10

## 2017-06-23 NOTE — PROGRESS NOTES
Jovanny Perez MD McAllister, Angela, EUGENIO                   Make it 50 mg q 6 hrs. thanks            Previous Messages       ----- Message -----      From: Heidi May RN      Sent: 6/23/2017   9:23 AM        To: Jovanny Perez MD   Subject: RE: pain meds                                     So regular tramadol 100 mg TID?   ----- Message -----      From: Jovanny Perez MD      Sent: 6/23/2017   8:45 AM        To: Heidi May RN   Subject: RE: pain meds                                     Sorry regular be ok. kathyaol   ----- Message -----      From: Heidi May RN      Sent: 6/21/2017  11:07 AM        To: Jovanny Perez MD   Subject: FW: pain meds                                     You ordered tramadol 100 mg (24 hr tablet) to be taken 3 times daily... Did you mean to order the 24 hr tab?     ThanksLayla              6/23/17: called pharmacy with correct tramadol prescription (50 mg every 6 hrs as needed for pain)

## 2017-07-07 ENCOUNTER — MEDICAL CORRESPONDENCE (OUTPATIENT)
Dept: HEALTH INFORMATION MANAGEMENT | Facility: CLINIC | Age: 67
End: 2017-07-07

## 2017-07-07 ENCOUNTER — CARE COORDINATION (OUTPATIENT)
Dept: NEUROLOGY | Facility: CLINIC | Age: 67
End: 2017-07-07

## 2017-07-07 NOTE — PROGRESS NOTES
"Dr Aguilar's nurse received the Ferriprox Protocol from Dr Buenrostro in [place]. It was scanned into patient's chart. Discussed with Dr Perez. He would like Dr. Ciaran Mckeon to review this protocol and offer any thoughts/suggestions prior to him prescribing it. Message sent to Dr Mckeon to review. Called patient's , Petar, with an update. He is very anxious to get \"the ball rolling\". Will await response from Dr Mckeon and pass on to Dr Perez.   "

## 2017-07-11 ENCOUNTER — MEDICAL CORRESPONDENCE (OUTPATIENT)
Dept: HEALTH INFORMATION MANAGEMENT | Facility: CLINIC | Age: 67
End: 2017-07-11

## 2017-07-11 DIAGNOSIS — G96.89 SUPERFICIAL SIDEROSIS OF CENTRAL NERVOUS SYSTEM: Primary | ICD-10-CM

## 2017-07-11 RX ORDER — DEFERIPRONE 500 MG/1
1000 TABLET ORAL 2 TIMES DAILY
Qty: 120 TABLET | Refills: 11 | Status: SHIPPED | OUTPATIENT
Start: 2017-07-11 | End: 2018-07-02

## 2017-07-11 NOTE — PROGRESS NOTES
Started in Deferiprone 1000 mg bid  On 5 days of the week.  CBC and ferritin levels weekly.  Discussed with Dr. Stevens and Jenny rowley

## 2017-07-12 ENCOUNTER — TELEPHONE (OUTPATIENT)
Dept: NEUROLOGY | Facility: CLINIC | Age: 67
End: 2017-07-12

## 2017-07-12 DIAGNOSIS — D50.8 OTHER IRON DEFICIENCY ANEMIA: ICD-10-CM

## 2017-07-12 DIAGNOSIS — R79.0 ABNORMAL LEVEL OF BLOOD MINERAL: ICD-10-CM

## 2017-07-12 DIAGNOSIS — G44.021 INTRACTABLE CHRONIC CLUSTER HEADACHE: Primary | ICD-10-CM

## 2017-07-12 NOTE — TELEPHONE ENCOUNTER
"----- Message from Ketty Monte RN sent at 7/11/2017  7:47 AM CDT -----  Regarding: FW: Ferriprox protocol   Message sent to Dr. Perez asking him to place order for Ferriprox if in agreement with Dr. Mckeon.    ----- Message -----     From: Ciaran Mckeon MD     Sent: 7/7/2017   3:19 PM       To: Jovanny Perez MD, Heidi May RN, #  Subject: RE: Ferriprox protocol                           Seems reasonable For first 2 months I would get CBC plat diff weekly  Get pre CBC iron tibc and ferritin.  javier  ----- Message -----     From: Heidi May RN     Sent: 7/7/2017  11:54 AM       To: Jovanny Perez MD, Ciaran Mckeon MD, #  Subject: Ferriprox protocol                               Hi Dr Mckeon,     Dr. Jovanny Perez wanted me to get in touch with you about this patient with superficial siderosis. He is considering prescribing Ferriprox and has been in contact with Dr. Nicanor Buenrostro who is a neurologist in [place]. I have scanned in an email with Dr. Buenrostro's Ferriprox protocol. You can find it in patient's chart under the \"media\" tab. He was hoping you could review it and offer any thoughts/suggestions.       Thanks,    Layla May RN  [facility] Neurology   Phone: [phone]      "

## 2017-07-12 NOTE — TELEPHONE ENCOUNTER
[facility] Prior Authorization Team   Phone: [phone]  Fax: [phone]      PA Initiation    Medication: ferriprox 500 mg .  Two tablets twice a day   Insurance Company: Medicare Blue RX - Phone [phone] Fax [phone]  Pharmacy Filling the Rx: [facility] - [place], [place] - [address]  Filling Pharmacy Phone: [phone]  Filling Pharmacy Fax:    Start Date: 7/12/2017

## 2017-07-12 NOTE — TELEPHONE ENCOUNTER
Prior Authorization Retail Medication Request  Medication/Dose: ferriprox 500 mg .  Two tablets twice a day   Diagnosis and ICD code: Superficial siderosis of central nervous system,  Abnormal level of blood mineral  New/Renewal/Insurance Change PA: New  Previously Tried and Failed Therapies: none    Insurance ID (if provided): Medicare blue  Insurance Phone (if provided): [phone]    Any additional info from fax request:     If you received a fax notification from an outside Pharmacy:  Pharmacy Name:[facility]  Pharmacy #: [phone]  Pharmacy Fax: [phone]

## 2017-07-13 NOTE — TELEPHONE ENCOUNTER
TIER EXCEPTION DENIED    Medication: ferriprox 500 mg - APPROVED - Tier exception initiated - DENIED    Denial Date:  07/13/2017    Denial Rational: Tier exception was denied as this drug is in the highest tier (Specialty Tier) and this Part D plan will not allow the drug at a lower tier. An appeal is available, and if desired, will require a letter of medical necessity. Please let the PA team know whether one has been written.        Appeal Information:

## 2017-07-13 NOTE — TELEPHONE ENCOUNTER
Tier Exception Initiated     Medication: ferriprox 500 mg - APPROVED - Tier exception initiated -   Insurance Company: Medicare Blue RX - Phone [phone] Fax [phone]  Pharmacy [facility]illing the Rx: [facility] [place] [address]  Filling Pharmacy Phone: [phone]  Filling Pharmacy Fax:    Start Date: 7/12/2017        Prior Authorization Approval    Authorization Effective Date: 4/13/2017  Authorization Expiration Date: 7/12/2018  Medication: ferriprox 500 mg - APPROVED  Approved Dose/Quantity:   Reference #:     Insurance Company: Medicare Blue RX - Phone [phone] Fax [phone]  Expected CoPay: $891.16     CoPay Card Available:      Foundation Assistance Needed:    Which Pharmacy is filling the prescription (Not needed for infusion/clinic administered): [facility] - [place] - [address]  Pharmacy Notified:    Patient Notified:

## 2017-07-18 ENCOUNTER — CARE COORDINATION (OUTPATIENT)
Dept: NEUROLOGY | Facility: CLINIC | Age: 67
End: 2017-07-18

## 2017-07-18 ENCOUNTER — MEDICAL CORRESPONDENCE (OUTPATIENT)
Dept: HEALTH INFORMATION MANAGEMENT | Facility: CLINIC | Age: 67
End: 2017-07-18

## 2017-07-18 NOTE — PROGRESS NOTES
Rx for Ferriprox can not be filled at [facility].  The only place that dispenses this medication is [facility] at [phone].  I called them and you need to be enrolled in the program.   I was faxed the forms to be filled out and signed by Dr. Perez and then faxed back to them.  They will then check with insurance and follow up with the patient as well as the clinic.  Forms filled out and signed by Dr. Preez and faxed to [phone].  My chart message sent to Renée informing her that this has been done and the Ferriprox services will be contacting them.

## 2017-07-18 NOTE — PROGRESS NOTES
Ketty Monte, Ketty Adams, EUGENIO                   Message sent to Dr. Perez asking him to place lab orders for this pt.  CBC and Ferritin weekly.            Previous Messages       ----- Message -----      From: Ketty Mnote RN      Sent: 7/12/2017   5:42 AM        To: Ketty Monte RN   Subject: FW: med ordered. Needs cbc and ferritin leve*         ----- Message -----      From: Jovanny Perez MD      Sent: 7/11/2017   3:51 PM        To: Heidi May RN   Subject: med ordered. Needs cbc and ferritin levels w*

## 2017-07-21 ENCOUNTER — CARE COORDINATION (OUTPATIENT)
Dept: NEUROLOGY | Facility: CLINIC | Age: 67
End: 2017-07-21

## 2017-07-21 DIAGNOSIS — R79.0 ABNORMAL LEVEL OF BLOOD MINERAL: Primary | ICD-10-CM

## 2017-07-21 NOTE — PROGRESS NOTES
Patient called back. She will be starting Ferriprox today or tomorrow. We already have baseline labs so no lab draw needed today. Pt would like to have her weekly/monthly labs drawn at [facility] in [place] ( Phone: [phone] Fax: [phone]). Standing orders were faxed with instructions to draw Ferritin, Iron, and CBC weekly and hepatic panel monthly. Also instructed to fax results to Dr Perez weekly. Ferriprox information mailed to patient. Pt was given our after hours contact info incase she has any questions/concerns with medication at night or over the weekend. Pt verbalized understanding and agreement with plan.

## 2017-07-27 ENCOUNTER — TRANSFERRED RECORDS (OUTPATIENT)
Dept: HEALTH INFORMATION MANAGEMENT | Facility: CLINIC | Age: 67
End: 2017-07-27

## 2017-08-03 ENCOUNTER — TRANSFERRED RECORDS (OUTPATIENT)
Dept: HEALTH INFORMATION MANAGEMENT | Facility: CLINIC | Age: 67
End: 2017-08-03

## 2017-08-10 ENCOUNTER — TRANSFERRED RECORDS (OUTPATIENT)
Dept: HEALTH INFORMATION MANAGEMENT | Facility: CLINIC | Age: 67
End: 2017-08-10

## 2017-08-14 ENCOUNTER — TELEPHONE (OUTPATIENT)
Dept: NEUROLOGY | Facility: CLINIC | Age: 67
End: 2017-08-14

## 2017-08-14 NOTE — TELEPHONE ENCOUNTER
----- Message from Jovanny Perez MD sent at 8/14/2017  1:31 PM CDT -----  Will forward the iron values to .  Not due for MRI yet.halley  ----- Message -----     From: Heidi May RN     Sent: 8/11/2017  12:03 PM       To: Jovanny Perez MD    Labs from 8/10... Ferritin 42 (was 44 last week) Hgb 12.4 (was 12.6) iron 92 (was 100) red blood cell 3.99 and hematocrit 36.9. Pt said she was feeling well the last 2 weeks but now her headaches have returned along with the tingling and sharp pains in her temple and dizziness.     Thanks,  Layla

## 2017-08-17 ENCOUNTER — TRANSFERRED RECORDS (OUTPATIENT)
Dept: HEALTH INFORMATION MANAGEMENT | Facility: CLINIC | Age: 67
End: 2017-08-17

## 2017-08-21 ENCOUNTER — CARE COORDINATION (OUTPATIENT)
Dept: NEUROLOGY | Facility: CLINIC | Age: 67
End: 2017-08-21

## 2017-08-21 NOTE — PROGRESS NOTES
Labs received from [facility] and forwarded to Dr. Perez via an in basket message.  Results sent to be scanned in to patient's chart.  Ferritin 42  Iron 95  CBC Normal

## 2017-08-24 ENCOUNTER — TRANSFERRED RECORDS (OUTPATIENT)
Dept: HEALTH INFORMATION MANAGEMENT | Facility: CLINIC | Age: 67
End: 2017-08-24

## 2017-08-29 ENCOUNTER — CARE COORDINATION (OUTPATIENT)
Dept: NEUROLOGY | Facility: CLINIC | Age: 67
End: 2017-08-29

## 2017-08-29 NOTE — PROGRESS NOTES
Labs from 8/24/17 received.  Ferritin 42.0,  RBC 4.05 (L), Iron 80.  Dr. Perez has reviewed the labs and states that he will send a my chart message to Ludwig.  Labs sent to be scanned in to epic.

## 2017-08-29 NOTE — PROGRESS NOTES
Heidi May, RN  Heidi May, RN                   8/25/17: Requested labs from [facility] in [place] be faxed to us. Watch for them and send to Chris             8/29/17:  Lab results received from [facility] in [place] and placed in Dr. Perez's folder for his review.

## 2017-08-31 ENCOUNTER — TRANSFERRED RECORDS (OUTPATIENT)
Dept: HEALTH INFORMATION MANAGEMENT | Facility: CLINIC | Age: 67
End: 2017-08-31

## 2017-09-02 ASSESSMENT — ENCOUNTER SYMPTOMS
NECK PAIN: 0
BLOATING: 0
TREMORS: 0
TROUBLE SWALLOWING: 0
NAUSEA: 0
PANIC: 0
NUMBNESS: 1
INSOMNIA: 1
DIARRHEA: 1
WEIGHT GAIN: 0
STIFFNESS: 0
NERVOUS/ANXIOUS: 0
TACHYCARDIA: 1
DECREASED CONCENTRATION: 1
SINUS CONGESTION: 0
LIGHT-HEADEDNESS: 1
MUSCLE WEAKNESS: 0
BOWEL INCONTINENCE: 0
FATIGUE: 1
BACK PAIN: 1
HOARSE VOICE: 0
HEARTBURN: 0
HYPOTENSION: 0
ORTHOPNEA: 0
DIZZINESS: 1
JOINT SWELLING: 0
CHILLS: 1
HYPERTENSION: 1
HEADACHES: 1
WEIGHT LOSS: 0
JAUNDICE: 0
NIGHT SWEATS: 1
SINUS PAIN: 0
MEMORY LOSS: 0
LEG SWELLING: 1
WEAKNESS: 0
MUSCLE CRAMPS: 0
NECK MASS: 0
BREAST PAIN: 0
LOSS OF CONSCIOUSNESS: 0
POLYPHAGIA: 0
POLYDIPSIA: 0
FEVER: 0
PALPITATIONS: 0
CLAUDICATION: 0
EXERCISE INTOLERANCE: 0
HALLUCINATIONS: 0
SORE THROAT: 0
VOMITING: 0
MYALGIAS: 1
RECTAL BLEEDING: 0
BREAST MASS: 0
RECTAL PAIN: 0
ALTERED TEMPERATURE REGULATION: 1
TINGLING: 0
SMELL DISTURBANCE: 0
TASTE DISTURBANCE: 0
SYNCOPE: 0
ABDOMINAL PAIN: 1
DEPRESSION: 0
INCREASED ENERGY: 1
PARALYSIS: 0
ARTHRALGIAS: 0
CONSTIPATION: 0
LEG PAIN: 0
SLEEP DISTURBANCES DUE TO BREATHING: 0
BLOOD IN STOOL: 0

## 2017-09-05 ENCOUNTER — OFFICE VISIT (OUTPATIENT)
Dept: NEUROLOGY | Facility: CLINIC | Age: 67
End: 2017-09-05

## 2017-09-05 ENCOUNTER — CARE COORDINATION (OUTPATIENT)
Dept: NEUROLOGY | Facility: CLINIC | Age: 67
End: 2017-09-05

## 2017-09-05 VITALS
SYSTOLIC BLOOD PRESSURE: 143 MMHG | DIASTOLIC BLOOD PRESSURE: 59 MMHG | RESPIRATION RATE: 20 BRPM | OXYGEN SATURATION: 98 % | HEART RATE: 67 BPM | HEIGHT: 61 IN | TEMPERATURE: 98.2 F | WEIGHT: 167.1 LBS | BODY MASS INDEX: 31.55 KG/M2

## 2017-09-05 DIAGNOSIS — G96.89 SUPERFICIAL SIDEROSIS OF CENTRAL NERVOUS SYSTEM: Primary | ICD-10-CM

## 2017-09-05 PROBLEM — R51.9 CHRONIC HEAD PAIN: Status: ACTIVE | Noted: 2017-01-09

## 2017-09-05 PROBLEM — G89.29 CHRONIC HEAD PAIN: Status: ACTIVE | Noted: 2017-01-09

## 2017-09-05 ASSESSMENT — PAIN SCALES - GENERAL: PAINLEVEL: NO PAIN (0)

## 2017-09-05 NOTE — MR AVS SNAPSHOT
After Visit Summary   9/5/2017    Renée Almeida    MRN: [id number]           Patient Information     Date Of Birth          1950        Visit Information        Provider Department      9/5/2017 10:00 AM Jovanny Perez MD [facility] Neurology        Today's Diagnoses     Superficial siderosis of central nervous system    -  1       Follow-ups after your visit        Follow-up notes from your care team     Return in about 6 months (around 3/5/2018).      Your next 10 appointments already scheduled     Mar 06, 2018 10:30 AM CST   (Arrive by 10:15 AM)   Return Visit with Jovanny Perez MD   [facility] Neurology ([facility])    [address]  [address] [place] [address] [phone]              Future tests that were ordered for you today     Open Future Orders        Priority Expected Expires Ordered    MRI Brain w/o contrast Routine  9/5/2018 9/5/2017            Who to contact     Please call your clinic at [phone] to:    Ask questions about your health    Make or cancel appointments    Discuss your medicines    Learn about your test results    Speak to your doctor   If you have compliments or concerns about an experience at your clinic, or if you wish to file a complaint, please contact [facility] Patient Relations at [phone] or email us at [email or web address]         Additional Information About Your Visit        MyChart Information     [facility] gives you secure access to your electronic health record. If you see a primary care provider, you can also send messages to your care team and make appointments. If you have questions, please call your primary care clinic.  If you do not have a primary care provider, please call [phone] and they will assist you.      [facility] is an electronic gateway that provides easy, online access to your medical records. With [facility], you can request a clinic appointment,  "read your test results, renew a prescription or communicate with your care team.     To access your existing account, please contact your [facility] or call [phone] for assistance.        Care EveryWhere ID     This is your Care EveryWhere ID. This could be used by other organizations to access your [facility] medical records  [id number]        Your Vitals Were     Pulse Temperature Respirations Height Pulse Oximetry Breastfeeding?    67 98.2  F (36.8  C) 20 1.549 m (5' 1\") 98% No    BMI (Body Mass Index)                   31.57 kg/m2            Blood Pressure from Last 3 Encounters:   09/05/17 143/59   06/08/17 145/73   06/02/17 128/68    Weight from Last 3 Encounters:   09/05/17 75.8 kg (167 lb 1.6 oz)   06/02/17 74.8 kg (164 lb 14.5 oz)   05/23/17 75 kg (165 lb 6.4 oz)               Primary Care Provider Office Phone # Fax #    Mark Anthony Muhammad [phone]       [place] [id number]        Equal Access to Services     [facility] AH: Hadii aad ku hadasho Soomaali, waaxda luqadaha, qaybta kaalmada adeegyada, chinedu ordonez . So [facility] [phone].    ATENCIÓN: Si habla español, tiene a womack disposición servicios gratuitos de asistencia lingüística. [facility] [phone].    We comply with applicable federal civil rights laws and [place] laws. We do not discriminate on the basis of race, color, national origin, age, disability sex, sexual orientation or gender identity.            Thank you!     Thank you for choosing [facility] NEUROLOGY  for your care. Our goal is always to provide you with excellent care. Hearing back from our patients is one way we can continue to improve our services. Please take a few minutes to complete the written survey that you may receive in the mail after your visit with us. Thank you!             Your Updated Medication List - Protect others around you: Learn how to safely use, " store and throw away your medicines at www.disposemymeds.org.          This list is accurate as of: 9/5/17 11:29 AM.  Always use your most recent med list.                   Brand Name Dispense Instructions for use Diagnosis    amLODIPine 2.5 MG tablet    NORVASC     Take 2.5 mg by mouth daily        Deferiprone 500 MG Tabs    FERRIPROX    120 tablet    Take 1,000 mg by mouth 2 times daily 1000 mg twice a day x 5 days of the week    Superficial siderosis of central nervous system       hydrochlorothiazide 25 MG tablet    HYDRODIURIL     Take 25 mg by mouth daily        omeprazole 20 MG CR capsule    priLOSEC     Take 20 mg by mouth daily        oxyCODONE-acetaminophen 5-325 MG per tablet    PERCOCET     Take 1-2 tablets by mouth daily as needed        propranolol 80 MG 24 hr capsule    INDERAL LA     Take 80 mg by mouth daily        traMADol 50 MG tablet    ULTRAM    180 tablet    1 tab q 6 hrs as needed for pain.    Chronic tension-type headache, intractable

## 2017-09-05 NOTE — PROGRESS NOTES
Neurology Outpatient Visit            Renée Almeida MRN# [id number]   YOB: 1950 Age: 66 year old      Primary care provider: Mark Anthony Muhammad          Assessment and Plan:   Renée Almeida is a 67 yo female with PMH of HTN, chronic headache, and superficial siderosis who presents for follow-up on iron chelation therapy, Ferriprox. Patient endorsed that Ferriprox has been working to decrease her headaches and she feels she is able to do more during the day. She denies any increased bleeding, bruising, fatigue, ataxia, dementia, and changes in hearing. Patient denied any GI upset from the medication. Patient's hemoglobin has remained stable. Patient's questions and concerns about her diagnosis were addressed in detail today. We will schedule an MRI to assess how well the iron chelator is working and to help guide future management.    Plan:  -Continue Ferriprox 1,000 mg BID 5 days out of the week  -Schedule MRI today, 9/5/17 to assess iron deposition  -Begin spacing out CBC, Iron and Ferritin labs from once weekly to every two weeks  -Follow-up in 3 months or sooner if needed    Scribed by Karen Warren MS4 for Dr. Jovanny Perez MD              Chief Complaint:   Renée Almeida is a 67 yo female with PMH of HTN, chronic headache, and superficial siderosis who presents for follow-up on iron chelation therapy, Ferriprox. Patient was last seen on 6/8/17. She is accompanied by her  today. Patient denied any medication side effects such as easy bruising, bleeding, ataxia, anosmia, alterations in memory or loss of hearing although she occasionally has short episodes of a sharp throbbing pain in either ear that comes and goes. Episodes last for a few seconds and do not seem to be provoked by anything. Patient continues to have tingling all over her face but feels this has decreased in severity since beginning Ferriprox. Patient also endorsed having decreased headache  frequency and severity since beginning Ferriprox but is unable to quantify the change. Patient contines to experience positional dizziness, especially when rotating her head from side to side and when looking up and down. This has remained unchanged.    Patient endorsed having some word finding difficulty, which is new for the patient. Patient is eventually able to recall the word but it takes some time. This has been going on for about one month. Patient has also been experiencing insomnia for the past month and often feels fatigued from lack of sleep. She is not currently taking a sleep aid and is unaware what may be causing her insomnia. Patient's  thinks it is most likely secondary to her recent diagnosis.               Past Medical History:     Past Medical History:   Diagnosis Date     Arthritis     hand     Gastroesophageal reflux disease      Hypertension      Superficial siderosis of central nervous system              Past Surgical History:     Past Surgical History:   Procedure Laterality Date     CHOLECYSTECTOMY       HYSTERECTOMY       KNEE SURGERY      right knee arthroscopy     SHOULDER SURGERY      right rotator cuff and bicep repair              Family History:   No family history on file.          Immunizations:   Immunizations are up to date         Allergies:     Allergies   Allergen Reactions     Codeine Other (See Comments)     Chest pain             Medications:     Current Outpatient Prescriptions   Medication     Deferiprone (FERRIPROX) 500 MG TABS     traMADol (ULTRAM) 50 MG tablet     amLODIPine (NORVASC) 2.5 MG tablet     hydrochlorothiazide (HYDRODIURIL) 25 MG tablet     omeprazole (PRILOSEC) 20 MG CR capsule     oxyCODONE-acetaminophen (PERCOCET) 5-325 MG per tablet     propranolol (INDERAL LA) 80 MG 24 hr capsule     No current facility-administered medications for this visit.             Review of Systems:   The Review of Systems is negative other than noted in the HPI           "   Physical Exam:   /59 (BP Location: Left arm, Patient Position: Sitting)  Pulse 67  Temp 98.2  F (36.8  C)  Resp 20  Ht 1.549 m (5' 1\")  Wt 75.8 kg (167 lb 1.6 oz)  SpO2 98%  Breastfeeding? No  BMI 31.57 kg/m2  General appearance: Alert, pleasant, in no acute distress  Head: Normocephalic, atraumatic.  Eyes: Pupils are equal and reactive. EOMI. Conjunctiva clear, non icteric.   Ears: External ears normal BL. Hearing is normal to voice  Nose: Septum midline, nasal mucosa pink and moist. No discharge.  Mouth / Throat: Normal dentition.  No oral lesions. Pharynx non erythematous, tonsils without hypertrophy.  Neck: Supple, no enlarged LN, trachea midline.  LUNGS:  Breathing comfortably on room air. No wheezing, retractions or grunting.  Heart & CV:  Hemodynamically stable by vitals  Skin was without lesion    Neurologic:  Mental Status: awake, alert, and oriented. Speech and language are fluent and appropriate. Concentration and attention are intact. Recent and remote memory is intact.  CN II-XII intact  Motor: Strong, normal tone and mass.  Sensation: Intact for light tough  Cerebellar: Normal finger-nose-finger and finger-to-thumb tapping  Gait: Normal gait. No ataxia  Reflexes: 2+ and symmetric         Data:   Lab: None  Imaging: MRI ordered for today, 9/5/2017    CC  Copy to patient     [address] [place] [id number]               "

## 2017-09-05 NOTE — NURSING NOTE
Chief Complaint   Patient presents with     RECHECK     [facility]- HEADACHES, 3 MONTHS F/U     Bhavik Oliva, CMA

## 2017-09-05 NOTE — PROGRESS NOTES
9/5/17:  [facility] in Dr. Perez's folder for his review and to discuss at patient's appointment today.

## 2017-09-05 NOTE — PROGRESS NOTES
2017             Mark Anthony Muhammad MD   [address] [address]      RE: Renée Almeida    MRN: [id number]   : 1950      Dear Dr. Muhammad:       Ms. 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  She had a thorough workup to see if there were any bleeding vessels or arteriovenous malformation of the spinal cord, and all the studies have been normal with no evidence of any bleeding sites.  This is not uncommon in siderosis, which can have an identifiable cause in about 50% of the cases.  We have started her on a program to calculate the iron from her brain, and she has been receiving Ferriprox 1000 mg twice a day for 5 days of the week.  You have been following the serum iron, hemoglobin and ferritin at your local facility, and these have stable, and we appreciate your cooperation.      At the visit today, overall she says she is about 50% improved in her headaches and her positional dizziness and has been able to do more things.  She is currently painting the house, when she said in the past she could not do that.  She has really not any major pain killers except for some tramadol.  She takes amlodipine as well as hydrochlorothiazide.  She has had no side effects from the medications so far as the GI tract.      PHYSICAL EXAMINATION:  She is a very pleasant woman.  Her neck is supple.  Fundi are difficult to see because of photophobia, but show normal optic disks.  There are normal extraocular movements.  Reflexes are normoactive and symmetric.  Gait and station are unremarkable.  Blood pressure is fine.      In summary, she says she is about 50% improved in her headache and positional dizziness since she started the iron chelation for her superficial siderosis.  We will be obtaining an MRI today to see if we can see any reduction in the amount of iron.  The possibility is that  after the iron is removed by chelation that we may be able to see a bleeding source.  She seems satisfied with her treatment.  Continue the same dose, so she does not need to have iron every week, but every 2 weeks.  We will see her for followup.        Sincerely,      Jovanny Perez MD      cc:   Dilma Concepcion MD   [facility]    [address] [place] [address]      Ciaran Mckeon MD   [facility] Hematology/Oncology    [address] [place] [id number]      Her clinic at [place]         JOVANNY PEREZ MD             D: 2017 11:42   T: 2017 12:28   MT: dinah      Name:     SHAWN MAJOR   MRN:      [id number]        Account:      [id number]   :      1950           Service Date: 2017      Document: [id number]

## 2017-09-05 NOTE — LETTER
9/5/2017       RE: Renée Almeida  [address]  [place] [address]     Dear Colleague,    Thank you for referring your patient, Renée Almeida, to the [facility] NEUROLOGY at [facility]. Please see a copy of my visit note below.                                 Neurology Outpatient Visit            Renée Almeida MRN# [id number]   YOB: 1950 Age: 66 year old      Primary care provider: Mark Anthony Muhammad          Assessment and Plan:   Renée Almeida is a 65 yo female with PMH of HTN, chronic headache, and superficial siderosis who presents for follow-up on iron chelation therapy, Ferriprox. Patient endorsed that Ferriprox has been working to decrease her headaches and she feels she is able to do more during the day. She denies any increased bleeding, bruising, fatigue, ataxia, dementia, and changes in hearing. Patient denied any GI upset from the medication. Patient's hemoglobin has remained stable. Patient's questions and concerns about her diagnosis were addressed in detail today. We will schedule an MRI to assess how well the iron chelator is working and to help guide future management.    Plan:  -Continue Ferriprox 1,000 mg BID 5 days out of the week  -Schedule MRI today, 9/5/17 to assess iron deposition  -Begin spacing out CBC, Iron and Ferritin labs from once weekly to every two weeks  -Follow-up in 3 months or sooner if needed    Scribed by Karen Warren, MS4 for Dr. Jovanny Perez MD              Chief Complaint:   Reneé Almeida is a 65 yo female with PMH of HTN, chronic headache, and superficial siderosis who presents for follow-up on iron chelation therapy, Ferriprox. Patient was last seen on 6/8/17. She is accompanied by her  today. Patient denied any medication side effects such as easy bruising, bleeding, ataxia, anosmia, alterations in memory or loss of hearing although she occasionally has short episodes of a sharp throbbing pain in  either ear that comes and goes. Episodes last for a few seconds and do not seem to be provoked by anything. Patient continues to have tingling all over her face but feels this has decreased in severity since beginning Ferriprox. Patient also endorsed having decreased headache frequency and severity since beginning Ferriprox but is unable to quantify the change. Patient contines to experience positional dizziness, especially when rotating her head from side to side and when looking up and down. This has remained unchanged.    Patient endorsed having some word finding difficulty, which is new for the patient. Patient is eventually able to recall the word but it takes some time. This has been going on for about one month. Patient has also been experiencing insomnia for the past month and often feels fatigued from lack of sleep. She is not currently taking a sleep aid and is unaware what may be causing her insomnia. Patient's  thinks it is most likely secondary to her recent diagnosis.               Past Medical History:     Past Medical History:   Diagnosis Date     Arthritis     hand     Gastroesophageal reflux disease      Hypertension      Superficial siderosis of central nervous system              Past Surgical History:     Past Surgical History:   Procedure Laterality Date     CHOLECYSTECTOMY       HYSTERECTOMY       KNEE SURGERY      right knee arthroscopy     SHOULDER SURGERY      right rotator cuff and bicep repair              Family History:   No family history on file.          Immunizations:   Immunizations are up to date         Allergies:     Allergies   Allergen Reactions     Codeine Other (See Comments)     Chest pain             Medications:     Current Outpatient Prescriptions   Medication     Deferiprone (FERRIPROX) 500 MG TABS     traMADol (ULTRAM) 50 MG tablet     amLODIPine (NORVASC) 2.5 MG tablet     hydrochlorothiazide (HYDRODIURIL) 25 MG tablet     omeprazole (PRILOSEC) 20 MG CR capsule  "    oxyCODONE-acetaminophen (PERCOCET) 5-325 MG per tablet     propranolol (INDERAL LA) 80 MG 24 hr capsule     No current facility-administered medications for this visit.             Review of Systems:   The Review of Systems is negative other than noted in the HPI             Physical Exam:   /59 (BP Location: Left arm, Patient Position: Sitting)  Pulse 67  Temp 98.2  F (36.8  C)  Resp 20  Ht 1.549 m (5' 1\")  Wt 75.8 kg (167 lb 1.6 oz)  SpO2 98%  Breastfeeding? No  BMI 31.57 kg/m2  General appearance: Alert, pleasant, in no acute distress  Head: Normocephalic, atraumatic.  Eyes: Pupils are equal and reactive. EOMI. Conjunctiva clear, non icteric.   Ears: External ears normal BL. Hearing is normal to voice  Nose: Septum midline, nasal mucosa pink and moist. No discharge.  Mouth / Throat: Normal dentition.  No oral lesions. Pharynx non erythematous, tonsils without hypertrophy.  Neck: Supple, no enlarged LN, trachea midline.  LUNGS:  Breathing comfortably on room air. No wheezing, retractions or grunting.  Heart & CV:  Hemodynamically stable by vitals  Skin was without lesion    Neurologic:  Mental Status: awake, alert, and oriented. Speech and language are fluent and appropriate. Concentration and attention are intact. Recent and remote memory is intact.  CN II-XII intact  Motor: Strong, normal tone and mass.  Sensation: Intact for light tough  Cerebellar: Normal finger-nose-finger and finger-to-thumb tapping  Gait: Normal gait. No ataxia  Reflexes: 2+ and symmetric         Data:   Lab: None  Imaging: MRI ordered for today, 2017    CC  Copy to patient      [address]  [facility] [address]                 2017             Mark Anthony Muhammad MD   [address] [address]      RE: Renée Almeida    MRN: [id number]   : 1950      Dear Dr. Muhammad:       Ms. Almeida is a 66-year-old woman who has been diagnosed with superficial " siderosis of the brain of unknown etiology.  She had a thorough workup to see if there were any bleeding vessels or arteriovenous malformation of the spinal cord, and all the studies have been normal with no evidence of any bleeding sites.  This is not uncommon in siderosis, which can have an identifiable cause in about 50% of the cases.  We have started her on a program to calculate the iron from her brain, and she has been receiving Ferriprox 1000 mg twice a day for 5 days of the week.  You have been following the serum iron, hemoglobin and ferritin at your local facility, and these have stable, and we appreciate your cooperation.      At the visit today, overall she says she is about 50% improved in her headaches and her positional dizziness and has been able to do more things.  She is currently painting the house, when she said in the past she could not do that.  She has really not any major pain killers except for some tramadol.  She takes amlodipine as well as hydrochlorothiazide.  She has had no side effects from the medications so far as the GI tract.      PHYSICAL EXAMINATION:  She is a very pleasant woman.  Her neck is supple.  Fundi are difficult to see because of photophobia, but show normal optic disks.  There are normal extraocular movements.  Reflexes are normoactive and symmetric.  Gait and station are unremarkable.  Blood pressure is fine.      In summary, she says she is about 50% improved in her headache and positional dizziness since she started the iron chelation for her superficial siderosis.  We will be obtaining an MRI today to see if we can see any reduction in the amount of iron.  The possibility is that after the iron is removed by chelation that we may be able to see a bleeding source.  She seems satisfied with her treatment.  Continue the same dose, so she does not need to have iron every week, but every 2 weeks.  We will see her for followup.        Sincerely,      Jovanny Perez MD       cc:   Dilma Concepcion MD   [facility]    [address] [place] [address]      Ciaran Mckeon MD   [facility] Hematology/Oncology    [address] [place] [address]      Her clinic at [place]               D: 2017 11:42   T: 2017 12:28   MT: dinah      Name:     SHAWN MAJOR   MRN:      [id number]        Account:      [id number]   :      1950           Service Date: 2017      Document: [id number]

## 2017-09-07 ENCOUNTER — TRANSFERRED RECORDS (OUTPATIENT)
Dept: HEALTH INFORMATION MANAGEMENT | Facility: CLINIC | Age: 67
End: 2017-09-07

## 2017-09-21 ENCOUNTER — TRANSFERRED RECORDS (OUTPATIENT)
Dept: HEALTH INFORMATION MANAGEMENT | Facility: CLINIC | Age: 67
End: 2017-09-21

## 2017-09-22 ENCOUNTER — CARE COORDINATION (OUTPATIENT)
Dept: NEUROLOGY | Facility: CLINIC | Age: 67
End: 2017-09-22

## 2017-09-22 NOTE — PROGRESS NOTES
Received patient's outside imaging from [facility]. Sent to film room to be scanned in. Message sent to Dr Perez.

## 2017-09-27 ENCOUNTER — CARE COORDINATION (OUTPATIENT)
Dept: NEUROLOGY | Facility: CLINIC | Age: 67
End: 2017-09-27

## 2017-09-27 NOTE — PROGRESS NOTES
Ciaran Mckeon MD Fiol, Miguel E, MD; Heidi May, RN                   When ferritin <20 may need to add a bit of oral iron   javier            Previous Messages       ----- Message -----      From: Jovanny Perez MD      Sent: 9/27/2017   7:14 AM        To: Ciaran Mckeon MD, *   Subject: Iron dropping--'recommend supplementation?          ----- Message -----      From: Heidi May, RN      Sent: 9/22/2017   7:40 AM        To: Jovanny Perez MD     Labs on 9/21...     Hgb 12.9   Ferritin 32.0   Iron 59     Thanks,   Layla

## 2017-10-05 ENCOUNTER — TRANSFERRED RECORDS (OUTPATIENT)
Dept: HEALTH INFORMATION MANAGEMENT | Facility: CLINIC | Age: 67
End: 2017-10-05

## 2017-10-19 ENCOUNTER — TRANSFERRED RECORDS (OUTPATIENT)
Dept: HEALTH INFORMATION MANAGEMENT | Facility: CLINIC | Age: 67
End: 2017-10-19

## 2017-11-02 ENCOUNTER — TRANSFERRED RECORDS (OUTPATIENT)
Dept: HEALTH INFORMATION MANAGEMENT | Facility: CLINIC | Age: 67
End: 2017-11-02

## 2017-11-16 ENCOUNTER — TRANSFERRED RECORDS (OUTPATIENT)
Dept: HEALTH INFORMATION MANAGEMENT | Facility: CLINIC | Age: 67
End: 2017-11-16

## 2017-11-20 ENCOUNTER — TELEPHONE (OUTPATIENT)
Dept: NEUROLOGY | Facility: CLINIC | Age: 67
End: 2017-11-20

## 2017-11-20 NOTE — TELEPHONE ENCOUNTER
----- Message from Jovanny Perez MD sent at 11/20/2017  1:26 PM CST -----  Regarding: RE: Labs  Stable for now. If no side effects as previously reported continue med. See my priivous note to her. halley  ----- Message -----     From: Heidi May RN     Sent: 11/17/2017   7:24 AM       To: Jovanny Perez MD  Subject: Labs                                             Labs from 11/16...    Iron: 109  Ferritin: 38  Hgb: 13.1    Thanks,  Layla

## 2017-11-30 ENCOUNTER — TRANSFERRED RECORDS (OUTPATIENT)
Dept: HEALTH INFORMATION MANAGEMENT | Facility: CLINIC | Age: 67
End: 2017-11-30

## 2017-12-14 ENCOUNTER — TRANSFERRED RECORDS (OUTPATIENT)
Dept: HEALTH INFORMATION MANAGEMENT | Facility: CLINIC | Age: 67
End: 2017-12-14

## 2017-12-29 ENCOUNTER — TRANSFERRED RECORDS (OUTPATIENT)
Dept: HEALTH INFORMATION MANAGEMENT | Facility: CLINIC | Age: 67
End: 2017-12-29

## 2018-01-02 ENCOUNTER — CARE COORDINATION (OUTPATIENT)
Dept: NEUROLOGY | Facility: CLINIC | Age: 68
End: 2018-01-02

## 2018-01-02 NOTE — PROGRESS NOTES
1/2/18:  Lab results received from [facility].  Ferritin 20.0                                                                                       Iron 111  Results placed in Dr. Perez's folder for his review.

## 2018-01-11 ENCOUNTER — TRANSFERRED RECORDS (OUTPATIENT)
Dept: HEALTH INFORMATION MANAGEMENT | Facility: CLINIC | Age: 68
End: 2018-01-11

## 2018-01-19 DIAGNOSIS — D50.8 OTHER IRON DEFICIENCY ANEMIA: Primary | ICD-10-CM

## 2018-01-19 RX ORDER — FERROUS SULFATE 325(65) MG
325 TABLET ORAL
Qty: 30 TABLET | Refills: 5 | Status: SHIPPED | OUTPATIENT
Start: 2018-01-19

## 2018-01-22 ENCOUNTER — CARE COORDINATION (OUTPATIENT)
Dept: NEUROLOGY | Facility: CLINIC | Age: 68
End: 2018-01-22

## 2018-01-22 DIAGNOSIS — D50.8 OTHER IRON DEFICIENCY ANEMIA: Primary | ICD-10-CM

## 2018-01-25 ENCOUNTER — TRANSFERRED RECORDS (OUTPATIENT)
Dept: HEALTH INFORMATION MANAGEMENT | Facility: CLINIC | Age: 68
End: 2018-01-25

## 2018-01-25 DIAGNOSIS — D50.8 OTHER IRON DEFICIENCY ANEMIAS (CODE): Primary | ICD-10-CM

## 2018-01-26 ENCOUNTER — TELEPHONE (OUTPATIENT)
Dept: NEUROLOGY | Facility: CLINIC | Age: 68
End: 2018-01-26

## 2018-01-26 NOTE — TELEPHONE ENCOUNTER
Prior Authorization Retail Medication Request  Medication/Dose: Ferriprox 500 mg tabs  Diagnosis and ICD code: superficial siderosis of central nervous system I67.89  New/Renewal/Insurance Change PA: renewal  Previously Tried and Failed Therapies: none    Insurance ID (if provided): ?  Insurance Phone (if provided): ?    Any additional info from fax request:     If you received a fax notification from an outside Pharmacy:  Pharmacy Name:[facility]  Pharmacy #:[phone]  Pharmacy Fax:[phone]

## 2018-02-08 ENCOUNTER — CARE COORDINATION (OUTPATIENT)
Dept: NEUROLOGY | Facility: CLINIC | Age: 68
End: 2018-02-08

## 2018-02-08 ENCOUNTER — TRANSFERRED RECORDS (OUTPATIENT)
Dept: HEALTH INFORMATION MANAGEMENT | Facility: CLINIC | Age: 68
End: 2018-02-08

## 2018-02-08 NOTE — PROGRESS NOTES
Additional lab work ordered by Dr Perez faxed to [facility] in [place] (fax: [phone]). Labs to be drawn week of March 5th. [employer] message sent to patient with this info.

## 2018-02-23 ENCOUNTER — TRANSFERRED RECORDS (OUTPATIENT)
Dept: HEALTH INFORMATION MANAGEMENT | Facility: CLINIC | Age: 68
End: 2018-02-23

## 2018-02-26 ENCOUNTER — CARE COORDINATION (OUTPATIENT)
Dept: NEUROLOGY | Facility: CLINIC | Age: 68
End: 2018-02-26

## 2018-02-26 NOTE — PROGRESS NOTES
Lab results received from [facility].  Ferritin: 54 Iron: 77  CBC: Normal values.  Results placed in Dr. Perez's folder.  My chart message sent to Renée with these results.

## 2018-03-08 ENCOUNTER — TRANSFERRED RECORDS (OUTPATIENT)
Dept: HEALTH INFORMATION MANAGEMENT | Facility: CLINIC | Age: 68
End: 2018-03-08

## 2018-03-12 ASSESSMENT — ENCOUNTER SYMPTOMS
TINGLING: 1
SPEECH CHANGE: 0
DECREASED CONCENTRATION: 1
BREAST MASS: 0
WEIGHT LOSS: 0
ABDOMINAL PAIN: 0
EYE REDNESS: 1
BREAST PAIN: 0
HOT FLASHES: 1
DIFFICULTY URINATING: 0
SINUS CONGESTION: 0
POLYDIPSIA: 0
HYPOTENSION: 0
NERVOUS/ANXIOUS: 1
DIARRHEA: 1
HYPERTENSION: 1
DOUBLE VISION: 0
LOSS OF CONSCIOUSNESS: 0
DISTURBANCES IN COORDINATION: 0
PARALYSIS: 0
TASTE DISTURBANCE: 1
BLOATING: 0
EXERCISE INTOLERANCE: 0
EYE WATERING: 1
PANIC: 1
ALTERED TEMPERATURE REGULATION: 1
BOWEL INCONTINENCE: 0
FATIGUE: 1
EYE IRRITATION: 0
NECK MASS: 0
LEG PAIN: 0
SORE THROAT: 0
LIGHT-HEADEDNESS: 1
DIZZINESS: 1
DECREASED LIBIDO: 1
VOMITING: 0
JAUNDICE: 0
HEADACHES: 1
SYNCOPE: 0
SMELL DISTURBANCE: 0
CHILLS: 1
HALLUCINATIONS: 0
HEMATURIA: 0
BLOOD IN STOOL: 0
TROUBLE SWALLOWING: 0
FLANK PAIN: 0
INCREASED ENERGY: 1
TREMORS: 0
ORTHOPNEA: 0
WEIGHT GAIN: 1
INSOMNIA: 1
DYSURIA: 0
CONSTIPATION: 0
EYE PAIN: 1
SLEEP DISTURBANCES DUE TO BREATHING: 0
POLYPHAGIA: 0
NAUSEA: 0
DECREASED APPETITE: 0
RECTAL PAIN: 0
FEVER: 0
HOARSE VOICE: 0
MEMORY LOSS: 0
SINUS PAIN: 0
WEAKNESS: 1
PALPITATIONS: 0
NUMBNESS: 0
HEARTBURN: 0
DEPRESSION: 0
NIGHT SWEATS: 1

## 2018-03-19 ENCOUNTER — CARE COORDINATION (OUTPATIENT)
Dept: NEUROLOGY | Facility: CLINIC | Age: 68
End: 2018-03-19

## 2018-03-19 ENCOUNTER — OFFICE VISIT (OUTPATIENT)
Dept: NEUROLOGY | Facility: CLINIC | Age: 68
End: 2018-03-19
Payer: COMMERCIAL

## 2018-03-19 VITALS
DIASTOLIC BLOOD PRESSURE: 71 MMHG | WEIGHT: 171.6 LBS | HEART RATE: 58 BPM | RESPIRATION RATE: 24 BRPM | SYSTOLIC BLOOD PRESSURE: 141 MMHG | HEIGHT: 61 IN | BODY MASS INDEX: 32.4 KG/M2 | TEMPERATURE: 97.9 F | OXYGEN SATURATION: 96 %

## 2018-03-19 DIAGNOSIS — E83.19 HEMOSIDEROSIS: Primary | ICD-10-CM

## 2018-03-19 DIAGNOSIS — G44.51 HEMICRANIA CONTINUA: ICD-10-CM

## 2018-03-19 ASSESSMENT — PAIN SCALES - GENERAL: PAINLEVEL: SEVERE PAIN (7)

## 2018-03-19 NOTE — PROGRESS NOTES
Service Date: 2018             Mark Anthony Muhammad MD   [address] [address]      RE: Renée Almeida    MRN: [id number]   : 1950      Dear Dr. Muhammad:       Ms. Renée Almeida is a 67-year-old woman who was diagnosed with superficial siderosis of the brain, cause unknown, who has been followed by the undersigned and has been on chelation therapy with Ferriprox with 1000 mg twice a day by 5 days of the week.  She came back to follow up on her headaches accompanied by her .        We have been replacing her iron, as her serum iron had dropped significantly.      The reason for the treatment of her head has been these constant headaches and also pressure in her ears with some aggravation by standing.  She says her life has been severely limited because of these.  We had done a previous MRI in September before treatment was on the way, and this was done in a local facility.  There was a mild decrease in both the cerebral hemisphere and the cerebellum in the iron, but otherwise it was unchanged.  She returns today and reports her headaches are better.  She is now using pain medication, but they are still constant.  She also talks about pressures in her ear and some ringing in her ears as well.  Her  reports she is significantly better.        PHYSICAL EXAMINATION:  Her blood pressure is 141/71.  Pulse is 58.  Her fundi look good.  The ears do not show any problems.  Some dryness perhaps, which we suggested using some glycerin drops.  The rest of her exam is unremarkable.        In summary, the headaches have improved, but they are still quite significant and affect her life significantly, too.  We will repeat the MRI and see if the iron has been disappearing more and compare it with the outside MRI if possible.  We will then reassess her treatment.  We will try to contact the physician in another hospital, who is an expert  in [facility], for any suggestions after we get the MRI.      Sincerely,      MD ALEX García MD             D: 2018   T: 2018   MT: dinah      Name:     SHAWN MAJOR   MRN:      [id number]        Account:      [id number]   :      1950           Service Date: 2018      Document: [id number]

## 2018-03-19 NOTE — NURSING NOTE
Chief Complaint   Patient presents with     RECHECK     [facility]- HEADACHES F/U     Bhavik Oliva, CMA

## 2018-03-19 NOTE — MR AVS SNAPSHOT
"              After Visit Summary   3/19/2018    Renée Almeida    MRN: [id number]           Patient Information     Date Of Birth          1950        Visit Information        Provider Department      3/19/2018 12:30 PM Jovanny Perez MD [facility] Neu[facility]        Today's Diagnoses     Hemosiderosis    -  1    Hemicrania continua           Follow-ups after your visit        Follow-up notes from your care team     Return if symptoms worsen or fail to improve.      Future tests that were ordered for you today     Open Future Orders        Priority Expected Expires Ordered    MRI Brain w/o contrast STAT  3/19/2019 3/19/2018            Who to contact     Please call your clinic at [phone] to:    Ask questions about your health    Make or cancel appointments    Discuss your medicines    Learn about your test results    Speak to your doctor            Additional Information About Your Visit        Gotcha Ninjashart Information     [employer] gives you secure access to your electronic health record. If you see a primary care provider, you can also send messages to your care team and make appointments. If you have questions, please call your primary care clinic.  If you do not have a primary care provider, please call [phone] and they will assist you.      [employer] is an electronic gateway that provides easy, online access to your medical records. With [employer], you can request a clinic appointment, read your test results, renew a prescription or communicate with your care team.     To access your existing account, please contact your [facility] or call [phone] for assistance.        Care EveryWhere ID     This is your Care EveryWhere ID. This could be used by other organizations to access your [facility] medical records  [id number]        Your Vitals Were     Pulse Temperature Respirations Height Pulse Oximetry Breastfeeding?    58 97.9  F (36.6  C) (Oral) 24 1.549 m (5' 1\") 96% No "    BMI (Body Mass Index)                   32.42 kg/m2            Blood Pressure from Last 3 Encounters:   03/19/18 141/71   09/05/17 143/59   06/08/17 145/73    Weight from Last 3 Encounters:   03/19/18 77.8 kg (171 lb 9.6 oz)   09/05/17 75.8 kg (167 lb 1.6 oz)   06/02/17 74.8 kg (164 lb 14.5 oz)               Primary Care Provider Office Phone # Fax #    Mark Anthony Muhammad [phone]       [facility] [place] [address] [address]        Equal Access to Services     [facility] campusEZRA : Hadii jin saunders hadasho Soannettaali, waaxda luqadaha, qaybta kaalmada adeegyada, chinedu ordonez . So [facility] [phone].    ATENCIÓN: Si habla español, tiene a womack disposición servicios gratuitos de asistencia lingüística. Llame al [phone].    We comply with applicable federal civil rights laws and [place] laws. We do not discriminate on the basis of race, color, national origin, age, disability, sex, sexual orientation, or gender identity.            Thank you!     Thank you for choosing [facility]UROLOGY  for your care. Our goal is always to provide you with excellent care. Hearing back from our patients is one way we can continue to improve our services. Please take a few minutes to complete the written survey that you may receive in the mail after your visit with us. Thank you!             Your Updated Medication List - Protect others around you: Learn how to safely use, store and throw away your medicines at www.disposemymeds.org.          This list is accurate as of 3/19/18  1:02 PM.  Always use your most recent med list.                   Brand Name Dispense Instructions for use Diagnosis    amLODIPine 2.5 MG tablet    NORVASC     Take 2.5 mg by mouth daily        Deferiprone 500 MG Tabs    FERRIPROX    120 tablet    Take 1,000 mg by mouth 2 times daily 1000 mg twice a day x 5 days of the week    Superficial siderosis of central nervous system       ferrous sulfate 325 (65 FE) MG  tablet    IRON    30 tablet    Take 1 tablet (325 mg) by mouth daily (with breakfast)    Other iron deficiency anemia       hydrochlorothiazide 25 MG tablet    HYDRODIURIL     Take 25 mg by mouth daily        omeprazole 20 MG CR capsule    priLOSEC     Take 20 mg by mouth daily        oxyCODONE-acetaminophen 5-325 MG per tablet    PERCOCET     Take 1-2 tablets by mouth daily as needed        propranolol 80 MG 24 hr capsule    INDERAL LA     Take 80 mg by mouth daily        sertraline 50 MG tablet    ZOLOFT     Take 50 mg by mouth daily        traMADol 50 MG tablet    ULTRAM    180 tablet    1 tab q 6 hrs as needed for pain.    Chronic tension-type headache, intractable

## 2018-03-19 NOTE — LETTER
3/19/2018       RE: Renée Almeida   [address]  [place] [address]     Dear Colleague,    Thank you for referring your patient, Renée Almeida, to the [facility] NEUROLOGY at [facility]. Please see a copy of my visit note below.    Service Date: 2018       Mark Anthony Muhammad MD   [address]   [place] [address]      RE: Renée Almeida    MRN: [id number]   : 1950      Dear Dr. Muhammad:       Ms. Renée Almeida is a 67-year-old woman who was diagnosed with superficial siderosis of the brain, cause unknown, who has been followed by the undersigned and has been on chelation therapy with Ferriprox with 1000 mg twice a day by 5 days of the week.  She came back to follow up on her headaches accompanied by her .        We have been replacing her iron, as her serum iron had dropped significantly.      The reason for the treatment of her head has been these constant headaches and also pressure in her ears with some aggravation by standing.  She says her life has been severely limited because of these.  We had done a previous MRI in September before treatment was on the way, and this was done in a local facility.  There was a mild decrease in both the cerebral hemisphere and the cerebellum in the iron, but otherwise it was unchanged.  She returns today and reports her headaches are better.  She is now using pain medication, but they are still constant.  She also talks about pressures in her ear and some ringing in her ears as well.  Her  reports she is significantly better.        PHYSICAL EXAMINATION:  Her blood pressure is 141/71.  Pulse is 58.  Her fundi look good.  The ears do not show any problems.  Some dryness perhaps, which we suggested using some glycerin drops.  The rest of her exam is unremarkable.        In summary, the headaches have improved, but they are still quite significant and  affect her life significantly, too.  We will repeat the MRI and see if the iron has been disappearing more and compare it with the outside MRI if possible.  We will then reassess her treatment.  We will try to contact the physician in another hospital, who is an expert in [facility], for any suggestions after we get the MRI.        D: 2018   T: 2018   MT: dinah      Name:     SHAWN MAJOR   MRN:      [id number]        Account:      [id number]   :      1950           Service Date: 2018      Document: [id number]       Again, thank you for allowing me to participate in the care of your patient.      Sincerely,    Jovanny Perez MD

## 2018-03-19 NOTE — PROGRESS NOTES
Teena Pepe Tracey, RN Cc: Jovanny Perez MD Hi Tracey,     Patient declined scheduling for an MRI today at the hospital. Instead she would like to go to [facility] in [place], [place]. Can you send orders there? I also gave her a printout of the order as well, not sure if that'll do.     Thanks,   Teena              3/19/18:  MRI orders faxed to [facility] per the patient's request.  [phone]

## 2018-03-21 ENCOUNTER — TRANSFERRED RECORDS (OUTPATIENT)
Dept: HEALTH INFORMATION MANAGEMENT | Facility: CLINIC | Age: 68
End: 2018-03-21

## 2018-03-22 ENCOUNTER — TRANSFERRED RECORDS (OUTPATIENT)
Dept: HEALTH INFORMATION MANAGEMENT | Facility: CLINIC | Age: 68
End: 2018-03-22

## 2018-04-05 ENCOUNTER — TRANSFERRED RECORDS (OUTPATIENT)
Dept: HEALTH INFORMATION MANAGEMENT | Facility: CLINIC | Age: 68
End: 2018-04-05

## 2018-04-05 ENCOUNTER — TELEPHONE (OUTPATIENT)
Dept: NEUROLOGY | Facility: CLINIC | Age: 68
End: 2018-04-05

## 2018-04-05 NOTE — TELEPHONE ENCOUNTER
Nurse received call from Patient's  inquiring about MRI results and what the next step should be.    Nurse spoke to Dr. Perez and after review of results, indicated that the Iron could be discontinued, and that he has been trying to consult with a MD from [place] ( or [place] ) that he has not received a response from.    Nurse returned call to  and relayed this information and indicated that we would call back as soon as we knew more.   is in agreement with this plan and will await return call.

## 2018-04-06 NOTE — PROGRESS NOTES
Service Date: 2018      Nicanor Buenrostro MD    of Neurology    Director Neuromyel[facility]    Pathology [facility], Room [address]   [address] [place] [address]      RE: Renée Almeida   MRN: [id number]   : 1950       Dear Dr. Buenrostro:      We previously consulted you and you have kindly helped us in the case of Mrs. Renée Almeida, [place] medical record number [id number], who is 67-years-old with a clinical and imaging picture of superficial siderosis of the brain.  We started treatment as per your protocol with Ferriprox 1000 b.i.d. for 5 days since July.  This latest MRI 9 months after starting the medication, which she is taking very faithful, showed no change in the amount of iron deposition.  She has remained stable throughout the treatment with only mild symptomatic improvement.  Her iron levels and so forth have been dealt with through recommendation of Dr. Larry Mckeon from the [facility].      We seek your opinion once more in cases that do not respond after 9 months of chelation - are there any further points in continuing treatment or other new medical options.  I would glad to discuss further the case.  You can kindly call if you want at [phone].      Sincerely yours,      Jovanny Perez MD    Neurology Clinic [facility]         JOVANNY PEREZ MD             D: 2018   T: 2018   MT: JAMES      Name:     RENÉE ALMEIDA   MRN:      [id number]        Account:      [id number]   :      1950           Service Date: 2018      Document: [id number]

## 2018-04-10 ENCOUNTER — TELEPHONE (OUTPATIENT)
Dept: NEUROLOGY | Facility: CLINIC | Age: 68
End: 2018-04-10

## 2018-04-10 DIAGNOSIS — G44.221 CHRONIC TENSION-TYPE HEADACHE, INTRACTABLE: ICD-10-CM

## 2018-04-10 RX ORDER — TRAMADOL HYDROCHLORIDE 50 MG/1
TABLET ORAL
Qty: 124 TABLET | Refills: 3 | Status: SHIPPED | OUTPATIENT
Start: 2018-04-10

## 2018-04-10 NOTE — TELEPHONE ENCOUNTER
Nurse received refill request for: Tramadol 50 mg    Last re-ordered: 6/23/17    Last refill: 6/23/17    Last appointment: 3/19/18    Next appointment: none scheduled    Pharmacy:  [facility][id number]

## 2018-04-12 ENCOUNTER — TELEPHONE (OUTPATIENT)
Dept: NEUROLOGY | Facility: CLINIC | Age: 68
End: 2018-04-12

## 2018-04-12 NOTE — TELEPHONE ENCOUNTER
[facility] Call Center    Phone Message    May a detailed message be left on voicemail: yes    Reason for Call: Requesting Results   Name/type of test: MRI   Date of test: 3/19/18  Was test done at a location other than [facility] (Please fill in the location if not [facility])?: No      Action Taken: Message routed to:  [facility] .

## 2018-04-13 ENCOUNTER — TELEPHONE (OUTPATIENT)
Dept: NEUROLOGY | Facility: CLINIC | Age: 68
End: 2018-04-13

## 2018-04-13 NOTE — TELEPHONE ENCOUNTER
Nurse received e-mail from unit manager indicating that Patient's  was again calling to inquire about update.  Forwarded message to Dr. Perez who is currently away from clinic.  Will also mail MRI report to Patient.   Nurse called Patient's  and again told him as much about it as I understand - that the cyst is stable but unchanged.  Also indicated to him that I would get Dr. Perez to read it and tell me in layman's terms we can all understand what it means.  Further indicated to him that Dr. Perez has not yet heard back from MD he is consulting with as to what should be done next

## 2018-04-19 ENCOUNTER — TELEPHONE (OUTPATIENT)
Dept: NEUROLOGY | Facility: CLINIC | Age: 68
End: 2018-04-19

## 2018-04-19 ENCOUNTER — TRANSFERRED RECORDS (OUTPATIENT)
Dept: HEALTH INFORMATION MANAGEMENT | Facility: CLINIC | Age: 68
End: 2018-04-19

## 2018-04-19 NOTE — TELEPHONE ENCOUNTER
Prior Authorization Specialty Medication Request    Medication/Dose: ferriprox 500 mg tabs  ICD code (if different than what is on RX):  Superficial siderosis of central nervous system (I67.89)  Previously Tried and Failed:  NA    Important Lab Values: NA  Rationale: see notes    Insurance Name: ?  Insurance ID: ?  Insurance Phone Number: ?    Pharmacy Information (if different than what is on RX)  Name:  [facility]  Phone:  [phone]

## 2018-04-24 NOTE — TELEPHONE ENCOUNTER
PA Initiation    Medication: ferriprox 500 mg tabs  Insurance Company: [employer] - Phone [phone] Fax [phone]  Pharmacy Filling the Rx: [id number] - [place], [place] - [address]  Filling Pharmacy Phone:   Filling Pharmacy Fax:    Start Date: 4/24/2018    Central Prior Authorization Team   Phone: [phone]      Manually faxed Prior Auth request to [employer] at fax# [phone]

## 2018-05-01 NOTE — TELEPHONE ENCOUNTER
Called [facility] as have not had a response back yet with determination, per representative this was approved with dates 01/24/18-04/24/19 case id# [id number]. She will resend the approval fax and should see it shortly    Prior Authorization Approval    Authorization Effective Date: 1/24/2018  Authorization Expiration Date: 4/24/2019  Medication: ferriprox 500 mg tabs  Approved Dose/Quantity: 500mg  Reference #: [id number]   Insurance Company: [employer] - Phone [phone] Fax [phone]  Which Pharmacy is filling the prescription (Not needed for infusion/clinic administered): [facility][id number] - [place], [place] - [address]  Pharmacy Notified: Yes  Patient Notified: Yes- pharmacy will notify patient    Will document approval once received.

## 2018-05-03 ENCOUNTER — TRANSFERRED RECORDS (OUTPATIENT)
Dept: HEALTH INFORMATION MANAGEMENT | Facility: CLINIC | Age: 68
End: 2018-05-03

## 2018-05-17 ENCOUNTER — TRANSFERRED RECORDS (OUTPATIENT)
Dept: HEALTH INFORMATION MANAGEMENT | Facility: CLINIC | Age: 68
End: 2018-05-17

## 2018-05-22 ENCOUNTER — CARE COORDINATION (OUTPATIENT)
Dept: NEUROLOGY | Facility: CLINIC | Age: 68
End: 2018-05-22

## 2018-05-22 NOTE — PROGRESS NOTES
5/22/18:  Lab results received from [facility] and placed in Dr. Perez's folder for his review.  Ferritin: 51.0                                                                                                                                                               CBC: Lymphocytes 44.7 (H)                                                                                                                                                                Iron:  159 (H)

## 2018-05-31 ENCOUNTER — TRANSFERRED RECORDS (OUTPATIENT)
Dept: HEALTH INFORMATION MANAGEMENT | Facility: CLINIC | Age: 68
End: 2018-05-31

## 2018-06-28 ENCOUNTER — TRANSFERRED RECORDS (OUTPATIENT)
Dept: HEALTH INFORMATION MANAGEMENT | Facility: CLINIC | Age: 68
End: 2018-06-28

## 2018-07-02 ENCOUNTER — TELEPHONE (OUTPATIENT)
Dept: NEUROLOGY | Facility: CLINIC | Age: 68
End: 2018-07-02

## 2018-07-02 DIAGNOSIS — G96.89 SUPERFICIAL SIDEROSIS OF CENTRAL NERVOUS SYSTEM: ICD-10-CM

## 2018-07-02 RX ORDER — DEFERIPRONE 500 MG/1
1000 TABLET ORAL 2 TIMES DAILY
Qty: 120 TABLET | Refills: 11 | Status: SHIPPED | OUTPATIENT
Start: 2018-07-02

## 2018-07-02 NOTE — TELEPHONE ENCOUNTER
Nurse received refill request via fax from patient mail order pharmacy.  Called and spoke with her  to confirm pharmacy as it was last dispensed from another pharmacy.    Order sent as requested/ordered.

## 2018-07-12 ENCOUNTER — TRANSFERRED RECORDS (OUTPATIENT)
Dept: HEALTH INFORMATION MANAGEMENT | Facility: CLINIC | Age: 68
End: 2018-07-12

## 2018-07-26 ENCOUNTER — TRANSFERRED RECORDS (OUTPATIENT)
Dept: HEALTH INFORMATION MANAGEMENT | Facility: CLINIC | Age: 68
End: 2018-07-26

## 2018-07-30 ENCOUNTER — CARE COORDINATION (OUTPATIENT)
Dept: NEUROLOGY | Facility: CLINIC | Age: 68
End: 2018-07-30

## 2018-07-30 NOTE — PROGRESS NOTES
Lab results received from [facility] and placed in Dr. Perez's folder for his review.  Ferritin 29.0                                                                                                                                                 Iron  98

## 2018-08-01 ENCOUNTER — CARE COORDINATION (OUTPATIENT)
Dept: NEUROLOGY | Facility: CLINIC | Age: 68
End: 2018-08-01

## 2018-08-01 NOTE — PROGRESS NOTES
Faxed information requested by [facility]. Sent to fax # [phone] at 1050 on 08/01/2018.

## 2018-08-09 ENCOUNTER — TELEPHONE (OUTPATIENT)
Dept: NEUROLOGY | Facility: CLINIC | Age: 68
End: 2018-08-09

## 2018-08-09 ENCOUNTER — TRANSFERRED RECORDS (OUTPATIENT)
Dept: HEALTH INFORMATION MANAGEMENT | Facility: CLINIC | Age: 68
End: 2018-08-09

## 2018-08-09 DIAGNOSIS — R50.81 PANCYTOPENIA WITH FEVER (H): ICD-10-CM

## 2018-08-09 DIAGNOSIS — D61.818 PANCYTOPENIA WITH FEVER (H): ICD-10-CM

## 2018-08-09 DIAGNOSIS — E83.19 HEMOSIDEROSIS: Primary | ICD-10-CM

## 2018-08-09 DIAGNOSIS — G96.89 SUPERFICIAL SIDEROSIS OF CENTRAL NERVOUS SYSTEM: ICD-10-CM

## 2018-08-09 NOTE — TELEPHONE ENCOUNTER
[facility] Call Center    Phone Message    May a detailed message be left on voicemail: yes    Reason for Call: Other: Parul from [facility] needing standing orders faxed over to them. Pt needing weekly orders for Ferritin, Iron and CBC and monthly orders for Hepatic function. They can be faxed to [phone]     Action Taken: Message routed to:  [facility] neuro clinic

## 2018-09-07 ENCOUNTER — TRANSFERRED RECORDS (OUTPATIENT)
Dept: HEALTH INFORMATION MANAGEMENT | Facility: CLINIC | Age: 68
End: 2018-09-07

## 2018-09-17 ENCOUNTER — RADIANT APPOINTMENT (OUTPATIENT)
Dept: MRI IMAGING | Facility: CLINIC | Age: 68
End: 2018-09-17
Attending: PSYCHIATRY & NEUROLOGY
Payer: COMMERCIAL

## 2018-09-17 ENCOUNTER — OFFICE VISIT (OUTPATIENT)
Dept: NEUROLOGY | Facility: CLINIC | Age: 68
End: 2018-09-17
Payer: COMMERCIAL

## 2018-09-17 VITALS
HEART RATE: 65 BPM | HEIGHT: 61 IN | SYSTOLIC BLOOD PRESSURE: 128 MMHG | TEMPERATURE: 98 F | WEIGHT: 168 LBS | RESPIRATION RATE: 22 BRPM | DIASTOLIC BLOOD PRESSURE: 83 MMHG | OXYGEN SATURATION: 98 % | BODY MASS INDEX: 31.72 KG/M2

## 2018-09-17 VITALS
WEIGHT: 168 LBS | DIASTOLIC BLOOD PRESSURE: 87 MMHG | HEIGHT: 61 IN | SYSTOLIC BLOOD PRESSURE: 132 MMHG | HEART RATE: 77 BPM | BODY MASS INDEX: 31.72 KG/M2 | OXYGEN SATURATION: 98 %

## 2018-09-17 DIAGNOSIS — E83.19 HEMOSIDEROSIS: ICD-10-CM

## 2018-09-17 DIAGNOSIS — G96.89 SUPERFICIAL SIDEROSIS OF CENTRAL NERVOUS SYSTEM: ICD-10-CM

## 2018-09-17 DIAGNOSIS — R51.9 NONINTRACTABLE EPISODIC HEADACHE, UNSPECIFIED HEADACHE TYPE: Primary | ICD-10-CM

## 2018-09-17 DIAGNOSIS — G43.719 INTRACTABLE CHRONIC MIGRAINE WITHOUT AURA AND WITHOUT STATUS MIGRAINOSUS: Primary | ICD-10-CM

## 2018-09-17 DIAGNOSIS — G44.51 HEMICRANIA CONTINUA: ICD-10-CM

## 2018-09-17 LAB
ALP SERPL-CCNC: 69 U/L (ref 40–150)
ALT SERPL W P-5'-P-CCNC: 24 U/L (ref 0–50)
AST SERPL W P-5'-P-CCNC: 19 U/L (ref 0–45)
BASOPHILS # BLD AUTO: 0 10E9/L (ref 0–0.2)
BASOPHILS NFR BLD AUTO: 0 %
BILIRUB DIRECT SERPL-MCNC: <0.1 MG/DL (ref 0–0.2)
DIFFERENTIAL METHOD BLD: NORMAL
EOSINOPHIL # BLD AUTO: 0 10E9/L (ref 0–0.7)
EOSINOPHIL NFR BLD AUTO: 0 %
ERYTHROCYTE [DISTWIDTH] IN BLOOD BY AUTOMATED COUNT: 12.6 % (ref 10–15)
FERRITIN SERPL-MCNC: 15 NG/ML (ref 8–252)
HCT VFR BLD AUTO: 39.6 % (ref 35–47)
HGB BLD-MCNC: 12.8 G/DL (ref 11.7–15.7)
IMM GRANULOCYTES # BLD: 0 10E9/L (ref 0–0.4)
IMM GRANULOCYTES NFR BLD: 0.2 %
IRON SERPL-MCNC: 63 UG/DL (ref 35–180)
LYMPHOCYTES # BLD AUTO: 2.3 10E9/L (ref 0.8–5.3)
LYMPHOCYTES NFR BLD AUTO: 44.7 %
MCH RBC QN AUTO: 28.8 PG (ref 26.5–33)
MCHC RBC AUTO-ENTMCNC: 32.3 G/DL (ref 31.5–36.5)
MCV RBC AUTO: 89 FL (ref 78–100)
MONOCYTES # BLD AUTO: 0.4 10E9/L (ref 0–1.3)
MONOCYTES NFR BLD AUTO: 7.9 %
NEUTROPHILS # BLD AUTO: 2.4 10E9/L (ref 1.6–8.3)
NEUTROPHILS NFR BLD AUTO: 47.2 %
NRBC # BLD AUTO: 0 10*3/UL
NRBC BLD AUTO-RTO: 0 /100
PLATELET # BLD AUTO: 255 10E9/L (ref 150–450)
RBC # BLD AUTO: 4.45 10E12/L (ref 3.8–5.2)
WBC # BLD AUTO: 5.1 10E9/L (ref 4–11)

## 2018-09-17 RX ORDER — TRAMADOL HYDROCHLORIDE 50 MG/1
1 TABLET ORAL
COMMUNITY
Start: 2018-04-10

## 2018-09-17 RX ORDER — NITROGLYCERIN 0.4 MG/1
0.4 TABLET SUBLINGUAL
COMMUNITY
Start: 2018-04-17

## 2018-09-17 RX ORDER — FEXOFENADINE HCL AND PSEUDOEPHEDRINE HCI 60; 120 MG/1; MG/1
1 TABLET, EXTENDED RELEASE ORAL
COMMUNITY
Start: 2018-08-21

## 2018-09-17 RX ORDER — HYDROCHLOROTHIAZIDE 25 MG/1
25 TABLET ORAL
COMMUNITY
Start: 2018-04-26

## 2018-09-17 RX ORDER — PROPRANOLOL HYDROCHLORIDE 80 MG/1
80 CAPSULE, EXTENDED RELEASE ORAL 2 TIMES DAILY
COMMUNITY
Start: 2018-06-06

## 2018-09-17 ASSESSMENT — ENCOUNTER SYMPTOMS
SINUS CONGESTION: 0
NECK MASS: 0
TINGLING: 1
MEMORY LOSS: 0
DIZZINESS: 1
PARALYSIS: 0
SEIZURES: 0
SMELL DISTURBANCE: 0
LOSS OF CONSCIOUSNESS: 0
DISTURBANCES IN COORDINATION: 0
TREMORS: 0
SPEECH CHANGE: 0
SINUS PAIN: 0
WEAKNESS: 0
TASTE DISTURBANCE: 0
HOARSE VOICE: 0
TROUBLE SWALLOWING: 0
SORE THROAT: 0
BREAST PAIN: 0
BREAST MASS: 0
HEADACHES: 1

## 2018-09-17 ASSESSMENT — PAIN SCALES - GENERAL
PAINLEVEL: WORST PAIN (10)
PAINLEVEL: WORST PAIN (10)

## 2018-09-17 NOTE — LETTER
9/17/2018       RE: Renée Almeida  [address]  [place] [address]     Dear Colleague,    Thank you for referring your patient, Renée Almeida, to the [facility] NEUROLOGY at [facility]. Please see a copy of my visit note below.    [facility]   [facility]  Neurology Consult     Renée Almeida MRN# [id number]   YOB: 1950 Age: 67 year old     Requesting physician: Dr. 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 Perez.    She has a history of orthostatic headache in the 1980s that was thought to be secondary to spinal leak.  In 2014, she was found to have superficial siderosis on an MRI scan, after her headaches worsened.  She underwent extensive testing for underlying causes for her siderosis, but no vascular malformation, aneurysm, or other abnormality was found.  I wonder if the siderosis may be secondary to the spinal leak event in the 1980s, as there are some reports of siderosis following cerebral spinal fluid leaks.  She is undergoing therapy with chelating agent to attempt to address the siderosis.  Repeat MRIs have been unchanged and her headaches have not improved.  She has been told that therapy could take years to be effective.    In the meantime, she has been suffering from daily and severe headaches.  Her headaches have features of migraine; they are throbbing and severe, last longer than 4 hours, can be associated with nausea, are better with sleep, and are sometimes preceded by a facial tingling for minutes.  I recommended trial of preventative treatment options that have been shown to be helpful in migraine.  We also  discussed that her tramadol is no longer helpful for her headaches, and may in fact be harmful.  I recommended that she stop taking tramadol.    I discussed with her and her  today that there are several preventative treatment options that could be tried to help with her manage her daily headaches.  She has tried gabapentin in the past without relief, and she and her  are somewhat reluctant to try another preventative medication option today.  This trial may have been completed in the setting of medication use.  We discussed the different classes of medications that would be available, and she was open to a trial of an increased dose of propranolol, which she currently takes for hypertension, to attempt to treat both conditions without increasing the number of medications she takes.  She is aware that preventative treatments take time to become effective, and I recommended a 3 month trial prior to determining effectiveness, if tolerated.  Potential side effects were discussed today, and she was instructed to reduce her dose and contact me if she has significant side effects.  Her blood pressure has been running in the 130s systolic lately, so I think this increase would be well-tolerated.    1.  Superficial siderosis  2.  Chronic daily headache with chronic migraine features, possibly secondary to #1  3.  History of orthostatic headache secondary to CSF leak    Recommendations:  -Discontinue tramadol, as it has not been effective in treating her headaches, and may be contributing to medication overuse headache.  If she continues tramadol, I suggested she limit her use to no more than 1 day per week.  -As her constant daily headaches warrant a preventative treatment, I recommend increasing propranolol to 80 mg twice a day.  This could be titrated further to 80 mg 3 times per day if needed and tolerated.  Other preventatives that could be tried in the future, if needed, include topiramate, nortriptyline,  "or botulinum toxin injections.  -Keep track of headaches using a planner or calendar.  -As she has received significant benefit from occipital nerve blocks in the past, I think it would be reasonable to retrial this in the future if needed, as she is discontinuing tramadol.  -Given the positional triggering of her dizziness, a trial of physical therapy to address possible BPPV could be considered in the future.    I will plan to see her back in 3 months to monitor her progress.    Paradise Schofield MD  Neurology  Pager: [phone]            Chief Complaint:   Headache       History is obtained from the patient, , and medical record.      Renée Almeida is a 67 year old woman who is suffering from constant daily headaches.  She reports having \"normal\" headaches starting in her teens and early 20s.  These were generally well treated with 2 aspirin, and were infrequent.  In the 1980s, she had a headache that became worse until she could not get out of bed without a severe headache.  This headache was different in that it was strictly positional, completely resolving when lying down.  She was hospitalized and had a lumbar puncture at that time, and was told that she had a spinal leak.  She does not describe any intervention to alleviate this headache, but improved over 3 days.  Her headaches returned to baseline after this.    In 2014, she began to have more frequent headaches, which gradually progressed to become daily and severe.  Her headaches generally begin in the front of her head and radiate across the top of her head to the back.  They are described as a burning and aching pain at baseline, which can become throbbing when severe.  She currently rates her headache as a 10 out of 10 daily, which has been the case since August 2018.    Her headaches are worse with activity and she prefers to lie down and sleep and they are present.  She denies any warning that the headache is coming, but her new type of " headache can be proceeded by minutes of face tingling on occasion, but this is variable.  She also reports dizziness, which she describes as lightheadedness, and nausea.  She can also experience sharp pains anywhere in her head, particularly in her ears, which are brief and severe.    She denies photophobia or phonophobia.  She denies any vision changes associated with headache.  She denies any weakness or other symptoms associated with headache, with the exception of the face tingling and dizziness described above.  She denies any fevers associated with headache, but has had occasional chills.  She denies any autonomic features associated with headache.  There is not a clear positional component, but she does prefer to lie down when pain is severe.      She currently treats her pain with tramadol 50 mg as needed, which she takes once a day between 3 and 4 days a week on average, but this is not effective.  She also uses a heating pad, which she finds to be somewhat helpful.    In the past, she has tried gabapentin, titrating up to 12 pills per day without relief.  She currently takes propranolol 80 mg daily for hypertension.  She denies other medication for headache prevention.  She describes a bilateral occipital nerve block completed with an outside health system years ago.  The initial nerve block provided 5 months of relief.  Unfortunately, 2 subsequent nerve blocks were not as effective.    She was found to have superficial siderosis on an MRI brain that was completed in 2014 due to severe headaches.  Subsequent workup has been extensive, and has included MRIs of the neural axis, CT myelogram, and vascular imaging including angiography of the brain and spine.  She has also had empiric blood patch without relief. For treatment of siderosis, she currently takes Ferriprox, but she has been prescribed over the past year.  She has had repeat MRI imaging that has been stable.  She tells me she plans to take this for  several years.  She has not noticed any change in her headaches since starting the medication.            Past Medical History:     Past Medical History:   Diagnosis Date     Arthritis     hand     Gastroesophageal reflux disease      Hypertension      Migraines      Superficial siderosis of central nervous system               Past Surgical History:     Past Surgical History:   Procedure Laterality Date     CHOLECYSTECTOMY       COLONOSCOPY  2013     HYSTERECTOMY       KNEE SURGERY      right knee arthroscopy     SHOULDER SURGERY      right rotator cuff and bicep repair             Social History:     Social History     Social History     Marital status:      Spouse name: N/A     Number of children: N/A     Years of education: N/A     Occupational History     Not on file.     Social History Main Topics     Smoking status: Never Smoker     Smokeless tobacco: Never Used     Alcohol use No     Drug use: No     Sexual activity: Not Currently     Partners: Male     Other Topics Concern     Parent/Sibling W/ Cabg, Mi Or Angioplasty Before 65f 55m? No     Social History Narrative             Family History:   There is no known family history of headache.  Family History   Problem Relation Age of Onset     Aneurysm Paternal Grandmother      Diabetes Paternal Aunt              Allergies:      Allergies   Allergen Reactions     Codeine Other (See Comments) and Unknown     Chest pain  Chest pain  Chest pain             Medications:     I have reviewed this patient's current medications.  She reports that she has stopped taking amlodipine, has not taken ferrous sulfate, no longer takes nitroglycerin, no longer takes Percocet, and no longer takes sertraline.  Current Outpatient Prescriptions   Medication Sig     amLODIPine (NORVASC) 2.5 MG tablet Take 2.5 mg by mouth daily     Deferiprone (FERRIPROX) 500 MG TABS Take 1,000 mg by mouth 2 times daily 1000 mg twice a day x 5 days of the week     ferrous sulfate (IRON) 325  "(65 FE) MG tablet Take 1 tablet (325 mg) by mouth daily (with breakfast) (Patient not taking: Reported on 9/17/2018)     fexofenadine-pseudoePHEDrine (ALLEGRA-D)  MG per 12 hr tablet Take 1 tablet by mouth     hydrochlorothiazide (HYDRODIURIL) 25 MG tablet Take 25 mg by mouth     hydrochlorothiazide (HYDRODIURIL) 25 MG tablet Take 25 mg by mouth daily     nitroGLYcerin (NITROSTAT) 0.4 MG sublingual tablet Place 0.4 mg under the tongue     omeprazole (PRILOSEC) 20 MG CR capsule Take 20 mg by mouth daily     oxyCODONE-acetaminophen (PERCOCET) 5-325 MG per tablet Take 1-2 tablets by mouth daily as needed     propranolol (INDERAL LA) 80 MG 24 hr capsule Take 80 mg by mouth 2 times daily     propranolol (INDERAL LA) 80 MG 24 hr capsule Take 80 mg by mouth daily     sertraline (ZOLOFT) 50 MG tablet Take 50 mg by mouth daily     traMADol (ULTRAM) 50 MG tablet Take 1 tablet by mouth     traMADol (ULTRAM) 50 MG tablet 1 tab q 6 hrs as needed for pain. (Patient not taking: Reported on 9/17/2018)     No current facility-administered medications for this visit.              Review of Systems:   The 10 point Review of Systems is negative other than noted in the HPI and below:           Physical Exam:   /83  Pulse 65  Temp 98  F (36.7  C) (Oral)  Resp 22  Ht 1.549 m (5' 1\")  Wt 76.2 kg (168 lb)  SpO2 98%  Breastfeeding? No  BMI 31.74 kg/m2   Physical Exam:   General: NAD  Neurologic:   Mental Status Exam: Alert, awake and oriented to person, place and time. No dysarthria. Speech of normal fluency.   Cranial Nerves: PERRLA, funduscopic exam without papilledema, EOMs intact, no nystagmus, facial movements symmetric, facial sensation intact to light touch, hearing intact to conversation, palate and uvula rise symmetrically, no deviation in uvula or tongue, trapezius and SCMs 5/5 bilaterally, tongue midline and fully mobile. No atrophy or fasciculations.  Dizziness provoked with vertical head movements.   Motor: " Normal tone in all four extremities, no atrophy or fasciculations. 5/5 strength bilaterally in shoulder abduction, elbow extensors and flexors, , hip flexors, knee extensors and flexors, dorsi- and plantarflexion. No tremors or abnormal movements noted.   Sensory: Sensation intact to light touch, temperature, and vibration on distal arms and legs bilaterally.    Coordination: Finger-nose-finger and rapidly alternating movements intact bilaterally.    Reflexes: 2+ and symmetric in triceps, biceps, brachioradialis, patellar, Achilles, and plantars downgoing bilaterally.   Gait: Normal casual gait.  Able to toe and heel walk, with provocation of dizziness occurring with toe walking.  Tandem gait with difficulty.  Head: Normocephalic, atraumatic.  No tenderness to palpation over the head neck or shoulders.  No tinel sign.  Eyes: No conjunctival injection, no scleral icterus.   Mouth: No oral lesions, no erythema or exudate in the oropharynx  Respiratory: No increased work of breathing  Cardiovascular: No lower extremity edema  Extremities: Warm, dry           Data:   MRI brain: She had an MRI of the brain completed earlier today, which continues to show stable superficial siderosis and no acute intracranial pathology.        Again, thank you for allowing me to participate in the care of your patient.      Sincerely,    Paradise Schofield MD

## 2018-09-17 NOTE — NURSING NOTE
Chief Complaint   Patient presents with     New Patient     [facility] NEW PATIENT CONSULTATION VISIT FOR NONINTRACTIBLE EPISODIC HEADACHE     HAD APPOINTMENT WITH DR. AKTZ PRIOR TO THIS APPT - ONLY NEW SET OF VITALS TAKEN      Anastacio Crow MA

## 2018-09-17 NOTE — MR AVS SNAPSHOT
After Visit Summary   9/17/2018    Renée Almeida    MRN: [id number]           Patient Information     Date Of Birth          1950        Visit Information        Provider Department      9/17/2018 1:00 PM Paradise Schofield MD [facility] Neurology        Today's Diagnoses     Intractable chronic migraine without aura and without status migrainosus    -  1    Superficial siderosis of central nervous system          Care Instructions    Stop tramadol.  Increase propranolol from 80 mg daily to 80 mg twice daily.  Return to clinic in 3 months to monitor progress.           Follow-ups after your visit        Follow-up notes from your care team     Return in about 3 months (around 12/17/2018).      Who to contact     Please call your clinic at [phone] to:    Ask questions about your health    Make or cancel appointments    Discuss your medicines    Learn about your test results    Speak to your doctor            Additional Information About Your Visit        MyChart Information     [employer] gives you secure access to your electronic health record. If you see a primary care provider, you can also send messages to your care team and make appointments. If you have questions, please call your primary care clinic.  If you do not have a primary care provider, please call [phone] and they will assist you.      [employer] is an electronic gateway that provides easy, online access to your medical records. With [employer], you can request a clinic appointment, read your test results, renew a prescription or communicate with your care team.     To access your existing account, please contact your [facility] or call [phone] for assistance.        Care EveryWhere ID     This is your Care EveryWhere ID. This could be used by other organizations to access your [place] medical records  [id number]        Your Vitals Were     Pulse Temperature Respirations Height Pulse  "Oximetry Breastfeeding?    65 98  F (36.7  C) (Oral) 22 1.549 m (5' 1\") 98% No    BMI (Body Mass Index)                   31.74 kg/m2            Blood Pressure from Last 3 Encounters:   09/17/18 128/83   09/17/18 132/87   03/19/18 141/71    Weight from Last 3 Encounters:   09/17/18 76.2 kg (168 lb)   09/17/18 76.2 kg (168 lb)   03/19/18 77.8 kg (171 lb 9.6 oz)              Today, you had the following     No orders found for display       Primary Care Provider Office Phone # Fax #    Mark Anthony Muhammad [phone]       [facility] [address] [facility]  [facility] [address]        Equal Access to Services     GUANACO MCGHEE : Gonzalo mayorgao Soverenice, waaxda luqadaha, qaybta kaalmada adeegyada, chinedu ordonez . So [facility] [phone].    ATENCIÓN: Si habla español, tiene a womack disposición servicios gratuitos de asistencia lingüística. Llame al [phone].    We comply with applicable federal civil rights laws and [place] laws. We do not discriminate on the basis of race, color, national origin, age, disability, sex, sexual orientation, or gender identity.            Thank you!     Thank you for choosing [facility] NEUROLOGY  for your care. Our goal is always to provide you with excellent care. Hearing back from our patients is one way we can continue to improve our services. Please take a few minutes to complete the written survey that you may receive in the mail after your visit with us. Thank you!             Your Updated Medication List - Protect others around you: Learn how to safely use, store and throw away your medicines at www.disposemymeds.org.          This list is accurate as of 9/17/18  3:26 PM.  Always use your most recent med list.                   Brand Name Dispense Instructions for use Diagnosis    amLODIPine 2.5 MG tablet    NORVASC     Take 2.5 mg by mouth daily        Deferiprone 500 MG Tabs    FERRIPROX    120 tablet    Take 1,000 mg by mouth 2 times " daily 1000 mg twice a day x 5 days of the week    Superficial siderosis of central nervous system       ferrous sulfate 325 (65 Fe) MG tablet    IRON    30 tablet    Take 1 tablet (325 mg) by mouth daily (with breakfast)    Other iron deficiency anemia       fexofenadine-pseudoePHEDrine  MG per 12 hr tablet    ALLEGRA-D     Take 1 tablet by mouth        * hydrochlorothiazide 25 MG tablet    HYDRODIURIL     Take 25 mg by mouth daily        * hydrochlorothiazide 25 MG tablet    HYDRODIURIL     Take 25 mg by mouth        nitroGLYcerin 0.4 MG sublingual tablet    NITROSTAT     Place 0.4 mg under the tongue        omeprazole 20 MG CR capsule    priLOSEC     Take 20 mg by mouth daily        oxyCODONE-acetaminophen 5-325 MG per tablet    PERCOCET     Take 1-2 tablets by mouth daily as needed        * propranolol 80 MG 24 hr capsule    INDERAL LA     Take 80 mg by mouth daily        * propranolol 80 MG 24 hr capsule    INDERAL LA     Take 80 mg by mouth 2 times daily        sertraline 50 MG tablet    ZOLOFT     Take 50 mg by mouth daily        * traMADol 50 MG tablet    ULTRAM    124 tablet    1 tab q 6 hrs as needed for pain.    Chronic tension-type headache, intractable       * traMADol 50 MG tablet    ULTRAM     Take 1 tablet by mouth        * Notice:  This list has 6 medication(s) that are the same as other medications prescribed for you. Read the directions carefully, and ask your doctor or other care provider to review them with you.

## 2018-09-17 NOTE — MR AVS SNAPSHOT
After Visit Summary   9/17/2018    Renée Almeida    MRN: [id number]           Patient Information     Date Of Birth          1950        Visit Information        Provider Department      9/17/2018 9:30 AM Jovanny Perez MD [facility] Neurology        Today's Diagnoses     Nonintractable episodic headache, unspecified headache type    -  1       Follow-ups after your visit        Additional Services     NEUROLOGY ADULT REFERRAL       Your provider has referred you for the following:   DR NAYANA BRIGHT HEADACHES TO SEE HER TODAY FOR HEADACHES MANEGEMENT--nerve block --they are from [facility]    Please be aware that coverage of these services is subject to the terms and limitations of your health insurance plan.  Call member services at your health plan with any benefit or coverage questions.      Please bring the following with you to your appointment:    (1) Any X-Rays, CTs or MRIs which have been performed.  Contact the facility where they were done to arrange for  prior to your scheduled appointment.    (2) List of current medications  (3) This referral request   (4) Any documents/labs given to you for this referral                  Your next 10 appointments already scheduled     Sep 17, 2018  1:00 PM CDT   (Arrive by 12:45 PM)   New Patient Visit with Paradise Schofield MD   [facility] Neurology ([facility])    [address] [place], [place] [id number] [phone]              Who to contact     Please call your clinic at [phone] to:    Ask questions about your health    Make or cancel appointments    Discuss your medicines    Learn about your test results    Speak to your doctor            Additional Information About Your Visit        MyChart Information     [facility] gives you secure access to your electronic health record. If you see a primary care provider, you can also send messages to your care team and make appointments. If you have  "questions, please call your primary care clinic.  If you do not have a primary care provider, please call [phone] and they will assist you.      [employer] is an electronic gateway that provides easy, online access to your medical records. With [employer], you can request a clinic appointment, read your test results, renew a prescription or communicate with your care team.     To access your existing account, please contact your [facility] or call [phone] for assistance.        Care EveryWhere ID     This is your Care EveryWhere ID. This could be used by other organizations to access your [place] medical records  [id number]        Your Vitals Were     Pulse Height Pulse Oximetry BMI (Body Mass Index)          77 1.549 m (5' 1\") 98% 31.74 kg/m2         Blood Pressure from Last 3 Encounters:   09/17/18 132/87   03/19/18 141/71   09/05/17 143/59    Weight from Last 3 Encounters:   09/17/18 76.2 kg (168 lb)   03/19/18 77.8 kg (171 lb 9.6 oz)   09/05/17 75.8 kg (167 lb 1.6 oz)              We Performed the Following     NEUROLOGY ADULT REFERRAL       Information about OPIOIDS     PRESCRIPTION OPIOIDS: WHAT YOU NEED TO KNOW   We gave you an opioid (narcotic) pain medicine. It is important to manage your pain, but opioids are not always the best choice. You should first try all the other options your care team gave you. Take this medicine for as short a time (and as few doses) as possible.    Some activities can increase your pain, such as bandage changes or therapy sessions. It may help to take your pain medicine 30 to 60 minutes before these activities. Reduce your stress by getting enough sleep, working on hobbies you enjoy and practicing relaxation or meditation. Talk to your care team about ways to manage your pain beyond prescription opioids.    These medicines have risks:    DO NOT drive when on new or higher doses of pain medicine. These medicines can affect your alertness and " reaction times, and you could be arrested for driving under the influence (DUI). If you need to use opioids long-term, talk to your care team about driving.    DO NOT operate heavy machinery    DO NOT do any other dangerous activities while taking these medicines.    DO NOT drink any alcohol while taking these medicines.     If the opioid prescribed includes acetaminophen, DO NOT take with any other medicines that contain acetaminophen. Read all labels carefully. Look for the word  acetaminophen  or  Tylenol.  Ask your pharmacist if you have questions or are unsure.    You can get addicted to pain medicines, especially if you have a history of addiction (chemical, alcohol or substance dependence). Talk to your care team about ways to reduce this risk.    All opioids tend to cause constipation. Drink plenty of water and eat foods that have a lot of fiber, such as fruits, vegetables, prune juice, apple juice and high-fiber cereal. Take a laxative (Miralax, milk of magnesia, Colace, Senna) if you don t move your bowels at least every other day. Other side effects include upset stomach, sleepiness, dizziness, throwing up, tolerance (needing more of the medicine to have the same effect), physical dependence and slowed breathing.    Store your pills in a secure place, locked if possible. We will not replace any lost or stolen medicine. If you don t finish your medicine, please throw away (dispose) as directed by your pharmacist. The [place] [employer]ution [employer] has more information about safe disposal: [email or web address]         Primary Care Provider Office Phone # Fax #    Mark Anthony Muhammad [phone]       [facility] [place] [address] [address]        Equal Access to Services     [facility] TAZ : Gonzalo Mejia, sánchez armenta, chinedu ma. So [facility]  [phone].    ATENCIÓN: Si dav irizarry, tiene a womack disposición servicios gratuitos de asistencia lingüística. Felicia benoit [phone].    We comply with applicable federal civil rights laws and [place] laws. We do not discriminate on the basis of race, color, national origin, age, disability, sex, sexual orientation, or gender identity.            Thank you!     Thank you for choosing [facility]  for your care. Our goal is always to provide you with excellent care. Hearing back from our patients is one way we can continue to improve our services. Please take a few minutes to complete the written survey that you may receive in the mail after your visit with us. Thank you!             Your Updated Medication List - Protect others around you: Learn how to safely use, store and throw away your medicines at www.disposemymeds.org.          This list is accurate as of 9/17/18 10:05 AM.  Always use your most recent med list.                   Brand Name Dispense Instructions for use Diagnosis    amLODIPine 2.5 MG tablet    NORVASC     Take 2.5 mg by mouth daily        Deferiprone 500 MG Tabs    FERRIPROX    120 tablet    Take 1,000 mg by mouth 2 times daily 1000 mg twice a day x 5 days of the week    Superficial siderosis of central nervous system       ferrous sulfate 325 (65 Fe) MG tablet    IRON    30 tablet    Take 1 tablet (325 mg) by mouth daily (with breakfast)    Other iron deficiency anemia       fexofenadine-pseudoePHEDrine  MG per 12 hr tablet    ALLEGRA-D     Take 1 tablet by mouth        * hydrochlorothiazide 25 MG tablet    HYDRODIURIL     Take 25 mg by mouth daily        * hydrochlorothiazide 25 MG tablet    HYDRODIURIL     Take 25 mg by mouth        nitroGLYcerin 0.4 MG sublingual tablet    NITROSTAT     Place 0.4 mg under the tongue        omeprazole 20 MG CR capsule    priLOSEC     Take 20 mg by mouth daily        oxyCODONE-acetaminophen 5-325 MG per tablet    PERCOCET     Take 1-2  tablets by mouth daily as needed        * propranolol 80 MG 24 hr capsule    INDERAL LA     Take 80 mg by mouth daily        * propranolol 80 MG 24 hr capsule    INDERAL LA     Take 80 mg by mouth        sertraline 50 MG tablet    ZOLOFT     Take 50 mg by mouth daily        * traMADol 50 MG tablet    ULTRAM    124 tablet    1 tab q 6 hrs as needed for pain.    Chronic tension-type headache, intractable       * traMADol 50 MG tablet    ULTRAM     Take 1 tablet by mouth        * Notice:  This list has 6 medication(s) that are the same as other medications prescribed for you. Read the directions carefully, and ask your doctor or other care provider to review them with you.

## 2018-09-17 NOTE — PROGRESS NOTES
[facility]   Clinics and Surgery Center  Neurology Consult     Renée Almeida MRN# [id number]   YOB: 1950 Age: 67 year old     Requesting physician: Dr. 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 Perez.    She has a history of orthostatic headache in the 1980s that was thought to be secondary to spinal leak.  In 2014, she was found to have superficial siderosis on an MRI scan, after her headaches worsened.  She underwent extensive testing for underlying causes for her siderosis, but no vascular malformation, aneurysm, or other abnormality was found.  I wonder if the siderosis may be secondary to the spinal leak event in the 1980s, as there are some reports of siderosis following cerebral spinal fluid leaks.  She is undergoing therapy with chelating agent to attempt to address the siderosis.  Repeat MRIs have been unchanged and her headaches have not improved.  She has been told that therapy could take years to be effective.    In the meantime, she has been suffering from daily and severe headaches.  Her headaches have features of migraine; they are throbbing and severe, last longer than 4 hours, can be associated with nausea, are better with sleep, and are sometimes preceded by a facial tingling for minutes.  I recommended trial of preventative treatment options that have been shown to be helpful in migraine.  We also discussed that her tramadol is no longer helpful for her headaches, and may in fact be harmful.  I recommended that she stop taking tramadol.    I discussed with her and her  today that there are several preventative treatment options that could be tried to help with her  manage her daily headaches.  She has tried gabapentin in the past without relief, and she and her  are somewhat reluctant to try another preventative medication option today.  This trial may have been completed in the setting of medication use.  We discussed the different classes of medications that would be available, and she was open to a trial of an increased dose of propranolol, which she currently takes for hypertension, to attempt to treat both conditions without increasing the number of medications she takes.  She is aware that preventative treatments take time to become effective, and I recommended a 3 month trial prior to determining effectiveness, if tolerated.  Potential side effects were discussed today, and she was instructed to reduce her dose and contact me if she has significant side effects.  Her blood pressure has been running in the 130s systolic lately, so I think this increase would be well-tolerated.    1.  Superficial siderosis  2.  Chronic daily headache with chronic migraine features, possibly secondary to #1  3.  History of orthostatic headache secondary to CSF leak    Recommendations:  -Discontinue tramadol, as it has not been effective in treating her headaches, and may be contributing to medication overuse headache.  If she continues tramadol, I suggested she limit her use to no more than 1 day per week.  -As her constant daily headaches warrant a preventative treatment, I recommend increasing propranolol to 80 mg twice a day.  This could be titrated further to 80 mg 3 times per day if needed and tolerated.  Other preventatives that could be tried in the future, if needed, include topiramate, nortriptyline, or botulinum toxin injections.  -Keep track of headaches using a planner or calendar.  -As she has received significant benefit from occipital nerve blocks in the past, I think it would be reasonable to retrial this in the future if needed, as she is discontinuing  "tramadol.  -Given the positional triggering of her dizziness, a trial of physical therapy to address possible BPPV could be considered in the future.    I will plan to see her back in 3 months to monitor her progress.    Paradise Schofield MD  Neurology  Pager: [phone]            Chief Complaint:   Headache       History is obtained from the patient, , and medical record.      Renée Almeida is a 67 year old woman who is suffering from constant daily headaches.  She reports having \"normal\" headaches starting in her teens and early 20s.  These were generally well treated with 2 aspirin, and were infrequent.  In the 1980s, she had a headache that became worse until she could not get out of bed without a severe headache.  This headache was different in that it was strictly positional, completely resolving when lying down.  She was hospitalized and had a lumbar puncture at that time, and was told that she had a spinal leak.  She does not describe any intervention to alleviate this headache, but improved over 3 days.  Her headaches returned to baseline after this.    In 2014, she began to have more frequent headaches, which gradually progressed to become daily and severe.  Her headaches generally begin in the front of her head and radiate across the top of her head to the back.  They are described as a burning and aching pain at baseline, which can become throbbing when severe.  She currently rates her headache as a 10 out of 10 daily, which has been the case since August 2018.    Her headaches are worse with activity and she prefers to lie down and sleep and they are present.  She denies any warning that the headache is coming, but her new type of headache can be proceeded by minutes of face tingling on occasion, but this is variable.  She also reports dizziness, which she describes as lightheadedness, and nausea.  She can also experience sharp pains anywhere in her head, particularly in her ears, which are brief " and severe.    She denies photophobia or phonophobia.  She denies any vision changes associated with headache.  She denies any weakness or other symptoms associated with headache, with the exception of the face tingling and dizziness described above.  She denies any fevers associated with headache, but has had occasional chills.  She denies any autonomic features associated with headache.  There is not a clear positional component, but she does prefer to lie down when pain is severe.      She currently treats her pain with tramadol 50 mg as needed, which she takes once a day between 3 and 4 days a week on average, but this is not effective.  She also uses a heating pad, which she finds to be somewhat helpful.    In the past, she has tried gabapentin, titrating up to 12 pills per day without relief.  She currently takes propranolol 80 mg daily for hypertension.  She denies other medication for headache prevention.  She describes a bilateral occipital nerve block completed with an outside health system years ago.  The initial nerve block provided 5 months of relief.  Unfortunately, 2 subsequent nerve blocks were not as effective.    She was found to have superficial siderosis on an MRI brain that was completed in 2014 due to severe headaches.  Subsequent workup has been extensive, and has included MRIs of the neural axis, CT myelogram, and vascular imaging including angiography of the brain and spine.  She has also had empiric blood patch without relief. For treatment of siderosis, she currently takes Ferriprox, but she has been prescribed over the past year.  She has had repeat MRI imaging that has been stable.  She tells me she plans to take this for several years.  She has not noticed any change in her headaches since starting the medication.            Past Medical History:     Past Medical History:   Diagnosis Date     Arthritis     hand     Gastroesophageal reflux disease      Hypertension      Migraines       Superficial siderosis of central nervous system               Past Surgical History:     Past Surgical History:   Procedure Laterality Date     CHOLECYSTECTOMY       COLONOSCOPY  2013     HYSTERECTOMY       KNEE SURGERY      right knee arthroscopy     SHOULDER SURGERY      right rotator cuff and bicep repair             Social History:     Social History     Social History     Marital status:      Spouse name: N/A     Number of children: N/A     Years of education: N/A     Occupational History     Not on file.     Social History Main Topics     Smoking status: Never Smoker     Smokeless tobacco: Never Used     Alcohol use No     Drug use: No     Sexual activity: Not Currently     Partners: Male     Other Topics Concern     Parent/Sibling W/ Cabg, Mi Or Angioplasty Before 65f 55m? No     Social History Narrative             Family History:   There is no known family history of headache.  Family History   Problem Relation Age of Onset     Aneurysm Paternal Grandmother      Diabetes Paternal Aunt              Allergies:      Allergies   Allergen Reactions     Codeine Other (See Comments) and Unknown     Chest pain  Chest pain  Chest pain             Medications:     I have reviewed this patient's current medications.  She reports that she has stopped taking amlodipine, has not taken ferrous sulfate, no longer takes nitroglycerin, no longer takes Percocet, and no longer takes sertraline.  Current Outpatient Prescriptions   Medication Sig     amLODIPine (NORVASC) 2.5 MG tablet Take 2.5 mg by mouth daily     Deferiprone (FERRIPROX) 500 MG TABS Take 1,000 mg by mouth 2 times daily 1000 mg twice a day x 5 days of the week     ferrous sulfate (IRON) 325 (65 FE) MG tablet Take 1 tablet (325 mg) by mouth daily (with breakfast) (Patient not taking: Reported on 9/17/2018)     fexofenadine-pseudoePHEDrine (ALLEGRA-D)  MG per 12 hr tablet Take 1 tablet by mouth     hydrochlorothiazide (HYDRODIURIL) 25 MG tablet Take  25 mg by mouth     hydrochlorothiazide (HYDRODIURIL) 25 MG tablet Take 25 mg by mouth daily     nitroGLYcerin (NITROSTAT) 0.4 MG sublingual tablet Place 0.4 mg under the tongue     omeprazole (PRILOSEC) 20 MG CR capsule Take 20 mg by mouth daily     oxyCODONE-acetaminophen (PERCOCET) 5-325 MG per tablet Take 1-2 tablets by mouth daily as needed     propranolol (INDERAL LA) 80 MG 24 hr capsule Take 80 mg by mouth 2 times daily     propranolol (INDERAL LA) 80 MG 24 hr capsule Take 80 mg by mouth daily     sertraline (ZOLOFT) 50 MG tablet Take 50 mg by mouth daily     traMADol (ULTRAM) 50 MG tablet Take 1 tablet by mouth     traMADol (ULTRAM) 50 MG tablet 1 tab q 6 hrs as needed for pain. (Patient not taking: Reported on 9/17/2018)     No current facility-administered medications for this visit.              Review of Systems:   The 10 point Review of Systems is negative other than noted in the HPI and below:  Answers for HPI/ROS submitted by the patient on 9/17/2018   General Symptoms: No  Skin Symptoms: No  HENT Symptoms: Yes  EYE SYMPTOMS: No  HEART SYMPTOMS: No  LUNG SYMPTOMS: No  INTESTINAL SYMPTOMS: No  URINARY SYMPTOMS: No  GYNECOLOGIC SYMPTOMS: No  BREAST SYMPTOMS: Yes  SKELETAL SYMPTOMS: No  BLOOD SYMPTOMS: No  NERVOUS SYSTEM SYMPTOMS: Yes  MENTAL HEALTH SYMPTOMS: No  Ear pain: Yes  Ear discharge: No  Hearing loss: No  Tinnitus: Yes  Nosebleeds: No  Congestion: No  Sinus pain: No  Trouble swallowing: No   Voice hoarseness: No  Mouth sores: No  Sore throat: No  Tooth pain: No  Gum tenderness: No  Bleeding gums: No  Change in taste: No  Change in sense of smell: No  Dry mouth: No  Hearing aid used: No  Neck lump: No  Trouble with coordination: No  Dizziness or trouble with balance: Yes  Fainting or black-out spells: No  Memory loss: No  Headache: Yes  Seizures: No  Speech problems: No  Tingling: Yes  Tremor: No  Weakness: No  Difficulty walking: No  Paralysis: No  Discharge: No  Lumps: No  Pain: No  Nipple  "retraction: Yes         Physical Exam:   /83  Pulse 65  Temp 98  F (36.7  C) (Oral)  Resp 22  Ht 1.549 m (5' 1\")  Wt 76.2 kg (168 lb)  SpO2 98%  Breastfeeding? No  BMI 31.74 kg/m2   Physical Exam:   General: NAD  Neurologic:   Mental Status Exam: Alert, awake and oriented to person, place and time. No dysarthria. Speech of normal fluency.   Cranial Nerves: PERRLA, funduscopic exam without papilledema, EOMs intact, no nystagmus, facial movements symmetric, facial sensation intact to light touch, hearing intact to conversation, palate and uvula rise symmetrically, no deviation in uvula or tongue, trapezius and SCMs 5/5 bilaterally, tongue midline and fully mobile. No atrophy or fasciculations.  Dizziness provoked with vertical head movements.   Motor: Normal tone in all four extremities, no atrophy or fasciculations. 5/5 strength bilaterally in shoulder abduction, elbow extensors and flexors, , hip flexors, knee extensors and flexors, dorsi- and plantarflexion. No tremors or abnormal movements noted.   Sensory: Sensation intact to light touch, temperature, and vibration on distal arms and legs bilaterally.    Coordination: Finger-nose-finger and rapidly alternating movements intact bilaterally.    Reflexes: 2+ and symmetric in triceps, biceps, brachioradialis, patellar, Achilles, and plantars downgoing bilaterally.   Gait: Normal casual gait.  Able to toe and heel walk, with provocation of dizziness occurring with toe walking.  Tandem gait with difficulty.  Head: Normocephalic, atraumatic.  No tenderness to palpation over the head neck or shoulders.  No tinel sign.  Eyes: No conjunctival injection, no scleral icterus.   Mouth: No oral lesions, no erythema or exudate in the oropharynx  Respiratory: No increased work of breathing  Cardiovascular: No lower extremity edema  Extremities: Warm, dry           Data:   MRI brain: She had an MRI of the brain completed earlier today, which continues to show " stable superficial siderosis and no acute intracranial pathology.

## 2018-09-17 NOTE — LETTER
2018       RE: Renée Almeida   [address]  [facility] [address]     Dear Colleague,    Thank you for referring your patient, Renée Almeida, to the [facility] NEUROLOGY at [facility]. Please see a copy of my visit note below.    Service Date: 2018      RE: Renée Almeida    MRN: [id number]   : 1950      Dear Dr. Muhammad:       Ms. Renée Almeida is a 67-year-old woman with intractable headaches and dizziness and sensation of feeling funny in the head, which do not improve with her lying down.  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  The case has been consulted with several people, and the patient has been treated with iron sequestering agent Ferriprox 1000 mg twice a day for 5 days a week.  We have been following her hemoglobin, and it has been stable.   We did start her on iron.  She is on 325 FESO4 with breakfast.  Her symptoms have minimally improved.  She still has quite a bit of headaches all the time in her head and behind her eyes, and she is also very dizzy when she stands up and so forth.  We have received notice from a consultant that the treatment is to be continued for a longer period of time to be able to sequester the iron from the subarachnoid space.      They return today.  They are somewhat frustrated due to the persistent symptoms.      I reviewed her MRI just done, and this does show a slight decrease in the amount of iron, specially in the tentorium of the cerebellum.  I have asked one of our staff, Dr. Paradise Schofield, who has completed a fellowship on headaches, to consult her with the idea that if we cannot remove the iron or it has been removed very slowly that she may have some symptomatic treatment of her pain, including occipital nerve blocks, which have worked in the past, and even medications, which I have not really  tried a lot of them except tramadol, which does not help.      PHYSICAL EXAMINATION:   Blood pressure 132/87, pulse 77.        We will see how a pain management program goes with her until we continue to wait to see if the iron removal will be helpful. Headache clinic referral was done.        D: 2018   T: 2018   MT: dinah      Name:     SHAWN MAJOR   MRN:      [id number]        Account:      [id number]   :      1950           Service Date: 2018      Document: [id number]        Again, thank you for allowing me to participate in the care of your patient.      Sincerely,    Jovanny Perez MD    CC:  Mark Anthony Muhammad MD   [address] [address]

## 2018-09-17 NOTE — PATIENT INSTRUCTIONS
Stop tramadol.  Increase propranolol from 80 mg daily to 80 mg twice daily.  Return to clinic in 3 months to monitor progress.

## 2018-09-18 ASSESSMENT — PATIENT HEALTH QUESTIONNAIRE - PHQ9: SUM OF ALL RESPONSES TO PHQ QUESTIONS 1-9: 4

## 2018-09-18 NOTE — PROGRESS NOTES
Service Date: 2018      Mark Anthony Muhammad MD   [address] [address]      RE: Renée Almeida    MRN: [id number]   : 1950      Dear Dr. Muhammad:       Ms. Renée Almeida is a 67-year-old woman with intractable headaches and dizziness and sensation of feeling funny in the head, which do not improve with her lying down.  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  The case has been consulted with several people, and the patient has been treated with iron sequestering agent Ferriprox 1000 mg twice a day for 5 days a week.  We have been following her hemoglobin, and it has been stable.   We did start her on iron.  She is on 325 FESO4 with breakfast.  Her symptoms have minimally improved.  She still has quite a bit of headaches all the time in her head and behind her eyes, and she is also very dizzy when she stands up and so forth.  We have received notice from a consultant that the treatment is to be continued for a longer period of time to be able to sequester the iron from the subarachnoid space.      They return today.  They are somewhat frustrated due to the persistent symptoms.      I reviewed her MRI just done, and this does show a slight decrease in the amount of iron, specially in the tentorium of the cerebellum.  I have asked one of our staff, Dr. Paradise Schofield, who has completed a fellowship on headaches, to consult her with the idea that if we cannot remove the iron or it has been removed very slowly that she may have some symptomatic treatment of her pain, including occipital nerve blocks, which have worked in the past, and even medications, which I have not really tried a lot of them except tramadol, which does not help.      PHYSICAL EXAMINATION:   Blood pressure 132/87, pulse 77.        We will see how a pain management program goes with her  until we continue to wait to see if the iron removal will be helpful. Headache clinic referral was done.     Sincerely,      MD ALEX García MD             D: 2018   T: 2018   MT: dinah      Name:     SHAWN MAJOR   MRN:      [id number]        Account:      [id number]   :      1950           Service Date: 2018      Document: [id number]

## 2018-09-20 ENCOUNTER — TRANSFERRED RECORDS (OUTPATIENT)
Dept: HEALTH INFORMATION MANAGEMENT | Facility: CLINIC | Age: 68
End: 2018-09-20

## 2018-09-20 LAB
ABSOLUTE BASOPHILS (EXTERNAL): 0 THOU/CU MM
ABSOLUTE IMMATURE GRANULOCYTES (EXTERNAL): 0.01 THOU/CU MM
BASOPHILS NFR BLD AUTO: 0 %
EOSINOPHIL # BLD AUTO: 0 THOU/CU MM (ref 0–0.43)
EOSINOPHIL NFR BLD AUTO: 0 % (ref 0–6.7)
ERYTHROCYTE [DISTWIDTH] IN BLOOD BY AUTOMATED COUNT: 12.7 % (ref 11.5–14.5)
FERRITIN SERPL-MCNC: 22 NG/ML (ref 8–252)
HCT VFR BLD AUTO: 36.6 % (ref 37–47)
HEMOGLOBIN: 12.5 G/DL (ref 12–16)
IMMATURE GRANULOCYTES: 0.2 % (ref 0–0.4)
IRON: 66 UG/DL (ref 35–150)
LYMPHOCYTES # BLD AUTO: 2.2 THOU/CU MM (ref 0.8–3.1)
LYMPHOCYTES NFR BLD AUTO: 44.5 % (ref 16.6–42.8)
MCH RBC QN AUTO: 29.9 PG (ref 27.2–32.8)
MCHC RBC AUTO-ENTMCNC: 34.2 G/DL (ref 32–36)
MCV RBC AUTO: 88 FL (ref 80–97)
MONOCYTES # BLD AUTO: 0.3 THOU/CU MM (ref 0.2–1)
MONOCYTES NFR BLD AUTO: 6.6 % (ref 4.8–13.6)
NEUTROPHILS # BLD AUTO: 2.4 THOU/CU MM (ref 1.4–6.2)
NEUTROPHILS NFR BLD AUTO: 48.7 % (ref 42.4–72.3)
NRBC: 0 %
PLATELET COUNT - QUEST: 263 10^9/L (ref 150–450)
PMV BLD: 9.1 FL (ref 8.5–12)
RBC # BLD AUTO: 4.18 10^12/L (ref 4.09–5.34)
WBC # BLD AUTO: 4.8 10^9/L (ref 3.4–9.8)

## 2018-09-21 DIAGNOSIS — E83.19 HEMOSIDEROSIS: ICD-10-CM

## 2018-10-02 ENCOUNTER — TELEPHONE (OUTPATIENT)
Dept: NEUROLOGY | Facility: CLINIC | Age: 68
End: 2018-10-02

## 2018-10-02 NOTE — TELEPHONE ENCOUNTER
Prior Authorization Retail Medication Request    Medication/Dose: Deferiprone (FERRIPROX) 500 MG TABS  ICD code (if different than what is on RX):  R51  Previously Tried and Failed:  Tramadol, gabapentin  Rationale: Does not help.       Insurance Name:   [employer]  Insurance ID:  [id number]        Pharmacy Information (if different than what is on RX)  Name:  [facility]   Phone:  [phone]  Patients id: [id number]

## 2018-10-03 NOTE — TELEPHONE ENCOUNTER
Prior Authorization Not Needed per Insurance    *NOTE: [facility] [place] [place] - [address]*     Medication: Deferiprone (FERRIPROX) 500 MG TABS-PA NOT NEEDED  Insurance Company: -[employer] [phone] Fax [phone]  Expected CoPay:      Pharmacy Filling the Rx: [facility] - [place], [place] - [address]  Pharmacy Notified: No  Patient Notified: Yes    PA is not Needed per insurance. Insurance states that an exception has been granted for this medication for patient. Called patient to verify filling pharmacy and patient states that patient would like to  medication at [facility] - [place], [place] - [address]. Please sent New Prescription to that pharmacy.

## 2018-10-03 NOTE — TELEPHONE ENCOUNTER
Central Prior Authorization Team   [phone]    PA Initiation    Medication: Deferiprone (FERRIPROX) 500 MG TABS  Insurance Company: [employer] - Phone [phone] Fax [phone]  Pharmacy Filling the Rx: [facility] [place], [place]  Filling Pharmacy Phone: [phone]  Filling Pharmacy Fax:    Start Date: 10/3/2018    Marci DELANEY via faxed to [facility] at [phone].

## 2018-10-18 ENCOUNTER — TRANSFERRED RECORDS (OUTPATIENT)
Dept: HEALTH INFORMATION MANAGEMENT | Facility: CLINIC | Age: 68
End: 2018-10-18

## 2018-10-18 LAB
ABSOLUTE BASOPHILS (EXTERNAL): 0 THOU/CU MM
ABSOLUTE IMMATURE GRANULOCYTES(METAS,MYELOS,PROS) - HISTORICAL: 0.02 THOU/CU MM
BASOPHILS NFR BLD AUTO: 0 %
EOSINOPHIL # BLD AUTO: 0 THOU/CU MM (ref 0–0.43)
EOSINOPHIL NFR BLD AUTO: 0 % (ref 0–6.7)
ERYTHROCYTE [DISTWIDTH] IN BLOOD BY AUTOMATED COUNT: 12.8 % (ref 11.5–14.5)
FERRITIN SERPL-MCNC: 20 NG/ML (ref 8–252)
HCT VFR BLD AUTO: 37.1 % (ref 37–47)
HEMOGLOBIN: 12.5 G/DL (ref 12–16)
IMMATURE GRANULOCYTES(METAS,MYELOS,PROS) - HISTORICAL: 0.4 % (ref 0–0.4)
IRON: 68 UG/DL (ref 35–150)
LYMPHOCYTES # BLD AUTO: 2.1 THOU/CU MM (ref 0.8–3.1)
LYMPHOCYTES NFR BLD AUTO: 43.4 % (ref 16.6–42.8)
MCH RBC QN AUTO: 29.5 PG (ref 27.2–32.8)
MCHC RBC AUTO-ENTMCNC: 33.7 G/DL (ref 32–36)
MCV RBC AUTO: 88 FL (ref 80–97)
MONOCYTES # BLD AUTO: 0.3 THOU/CU MM (ref 0.2–1)
MONOCYTES NFR BLD AUTO: 7 % (ref 4.8–13.6)
NEUTROPHILS # BLD AUTO: 2.4 THOU/CU MM (ref 1.4–6.2)
NEUTROPHILS NFR BLD AUTO: 49.2 % (ref 42.4–72.3)
PLATELET COUNT - QUEST: 277 10^9/L (ref 150–450)
RBC # BLD AUTO: 4.24 10^12/L (ref 4.09–5.34)
WBC # BLD AUTO: 4.8 10^9/L (ref 3.4–9.8)

## 2018-10-23 DIAGNOSIS — D50.8 OTHER IRON DEFICIENCY ANEMIA: ICD-10-CM

## 2018-10-23 DIAGNOSIS — E83.19 HEMOSIDEROSIS: ICD-10-CM

## 2018-10-31 DIAGNOSIS — G96.89 SUPERFICIAL SIDEROSIS OF CENTRAL NERVOUS SYSTEM: ICD-10-CM

## 2018-11-01 ENCOUNTER — TRANSFERRED RECORDS (OUTPATIENT)
Dept: HEALTH INFORMATION MANAGEMENT | Facility: CLINIC | Age: 68
End: 2018-11-01

## 2018-11-01 LAB
ABSOLUTE BASOPHILS (EXTERNAL): 0 THOU/CU MM
ABSOLUTE IMMATURE GRANULOCYTES(METAS,MYELOS,PROS) - HISTORICAL: 0.01 THOU/CU MM
ALP SERPL-CCNC: 74 IU/L (ref 46–116)
ALT SERPL-CCNC: 27 IU/L (ref 12–65)
AST SERPL-CCNC: 19 IU/L (ref 15–37)
BASOPHILS NFR BLD AUTO: 0 %
BILIRUBIN DIRECT: <0.1 MG/DL (ref 0–0.3)
EOSINOPHIL # BLD AUTO: 0 THOU/CU MM (ref 0–0.43)
EOSINOPHIL NFR BLD AUTO: 0 % (ref 0–6.7)
ERYTHROCYTE [DISTWIDTH] IN BLOOD BY AUTOMATED COUNT: 12.5 % (ref 11.5–14.5)
FERRITIN SERPL-MCNC: 19 NG/ML (ref 8–252)
HCT VFR BLD AUTO: 38.5 % (ref 37–47)
HEMOGLOBIN: 12.7 G/DL (ref 12–16)
IMMATURE GRANULOCYTES(METAS,MYELOS,PROS) - HISTORICAL: 0.2 % (ref 0–0.4)
IMMATURE PLATELET FRACTION: 1.7 % (ref 0.9–11.2)
IRON: 42 UG/DL (ref 35–150)
LYMPHOCYTES # BLD AUTO: 1.8 THOU/CU MM (ref 0.8–3.1)
LYMPHOCYTES NFR BLD AUTO: 32.8 % (ref 16.6–42.8)
MCH RBC QN AUTO: 28.9 PG (ref 27.2–32.8)
MCHC RBC AUTO-ENTMCNC: 33 G/DL (ref 32–36)
MCV RBC AUTO: 88 FL (ref 80–97)
MONOCYTES # BLD AUTO: 0.3 THOU/CU MM (ref 0.2–1)
MONOCYTES NFR BLD AUTO: 4.8 % (ref 4.8–13.6)
NEUTROPHILS # BLD AUTO: 3.5 THOU/CU MM (ref 1.4–6.2)
NEUTROPHILS NFR BLD AUTO: 62.2 % (ref 42.4–72.3)
NRBC: 0 %
PLATELET COUNT - QUEST: 317 10^9/L (ref 150–450)
PMV BLD: 9.3 FL (ref 8.5–12)
RBC # BLD AUTO: 4.39 10^12/L (ref 4.09–5.34)
WBC # BLD AUTO: 5.6 10^9/L (ref 3.4–9.8)

## 2018-11-07 DIAGNOSIS — E83.19 HEMOSIDEROSIS: ICD-10-CM

## 2018-11-07 RX ORDER — DEFERIPRONE 500 MG/1
1000 TABLET ORAL 2 TIMES DAILY
Qty: 120 TABLET | Refills: 11 | OUTPATIENT
Start: 2018-11-07

## 2018-11-08 ENCOUNTER — TELEPHONE (OUTPATIENT)
Dept: NEUROLOGY | Facility: CLINIC | Age: 68
End: 2018-11-08

## 2019-10-04 ENCOUNTER — HEALTH MAINTENANCE LETTER (OUTPATIENT)
Age: 69
End: 2019-10-04

## 2020-02-10 ENCOUNTER — HEALTH MAINTENANCE LETTER (OUTPATIENT)
Age: 70
End: 2020-02-10

## 2020-11-14 ENCOUNTER — HEALTH MAINTENANCE LETTER (OUTPATIENT)
Age: 70
End: 2020-11-14

## 2021-01-15 ENCOUNTER — HEALTH MAINTENANCE LETTER (OUTPATIENT)
Age: 71
End: 2021-01-15

## 2021-03-28 ENCOUNTER — HEALTH MAINTENANCE LETTER (OUTPATIENT)
Age: 71
End: 2021-03-28

## 2021-09-12 ENCOUNTER — HEALTH MAINTENANCE LETTER (OUTPATIENT)
Age: 71
End: 2021-09-12

## 2022-04-24 ENCOUNTER — HEALTH MAINTENANCE LETTER (OUTPATIENT)
Age: 72
End: 2022-04-24

## 2022-11-19 ENCOUNTER — HEALTH MAINTENANCE LETTER (OUTPATIENT)
Age: 72
End: 2022-11-19

## 2023-04-09 ENCOUNTER — HEALTH MAINTENANCE LETTER (OUTPATIENT)
Age: 73
End: 2023-04-09

## 2023-06-01 ENCOUNTER — HEALTH MAINTENANCE LETTER (OUTPATIENT)
Age: 73
End: 2023-06-01